# Patient Record
Sex: MALE | Race: WHITE | Employment: FULL TIME | URBAN - METROPOLITAN AREA
[De-identification: names, ages, dates, MRNs, and addresses within clinical notes are randomized per-mention and may not be internally consistent; named-entity substitution may affect disease eponyms.]

---

## 2017-01-13 ENCOUNTER — OFFICE VISIT (OUTPATIENT)
Dept: FAMILY MEDICINE | Facility: CLINIC | Age: 42
End: 2017-01-13
Payer: COMMERCIAL

## 2017-01-13 VITALS
SYSTOLIC BLOOD PRESSURE: 131 MMHG | WEIGHT: 246 LBS | HEIGHT: 76 IN | TEMPERATURE: 97.8 F | DIASTOLIC BLOOD PRESSURE: 80 MMHG | BODY MASS INDEX: 29.96 KG/M2 | HEART RATE: 74 BPM | RESPIRATION RATE: 14 BRPM

## 2017-01-13 DIAGNOSIS — J02.9 SORE THROAT: Primary | ICD-10-CM

## 2017-01-13 LAB
DEPRECATED S PYO AG THROAT QL EIA: NORMAL
MICRO REPORT STATUS: NORMAL
SPECIMEN SOURCE: NORMAL

## 2017-01-13 PROCEDURE — 87880 STREP A ASSAY W/OPTIC: CPT | Performed by: PHYSICIAN ASSISTANT

## 2017-01-13 PROCEDURE — 87081 CULTURE SCREEN ONLY: CPT | Performed by: PHYSICIAN ASSISTANT

## 2017-01-13 PROCEDURE — 99213 OFFICE O/P EST LOW 20 MIN: CPT | Performed by: PHYSICIAN ASSISTANT

## 2017-01-13 NOTE — NURSING NOTE
"Chief Complaint   Patient presents with     Pharyngitis     Initial /80 mmHg  Pulse 74  Temp(Src) 97.8  F (36.6  C) (Oral)  Resp 14  Ht 6' 4\" (1.93 m)  Wt 246 lb (111.585 kg)  BMI 29.96 kg/m2 Estimated body mass index is 29.96 kg/(m^2) as calculated from the following:    Height as of this encounter: 6' 4\" (1.93 m).    Weight as of this encounter: 246 lb (111.585 kg)..  BP completed using cuff size large-RA      "

## 2017-01-13 NOTE — PATIENT INSTRUCTIONS
Viral Pharyngitis (Sore Throat)    You (or your child, if your child is the patient) have pharyngitis (sore throat). This infection is caused by a virus. It can cause throat pain that is worse when swallowing, aching all over, headache, and fever. The infection may be spread by coughing, kissing, or touching others after touching your mouth or nose. Antibiotic medications do not work against viruses, so they are not used for treating this condition.  Home care    If your symptoms are severe, rest at home. Return to work or school when you feel well enough.     Drink plenty of fluids to avoid dehydration.    For children: Use acetaminophen for fever, fussiness or discomfort. In infants over six months of age, you may use ibuprofen instead of acetaminophen. (NOTE: If your child has chronic liver or kidney disease or ever had a stomach ulcer or GI bleeding, talk with your doctor before using these medicines.) (NOTE: Aspirin should never be used in anyone under 18 years of age who is ill with a fever. It may cause severe liver damage.)     For adults: You may use acetaminophen or ibuprofen to control pain or fever, unless another medicine was prescribed for this. (NOTE: If you have chronic liver or kidney disease or ever had a stomach ulcer or GI bleeding, talk with your doctor before using these medicines.)    Throat lozenges or numbing throat sprays can help reduce pain. Gargling with warm salt water will also help reduce throat pain. For this, dissolve 1/2 teaspoon of salt in 1 glass of warm water. To help soothe a sore throat, children can sip on juice or a popsicle. Children 5 years and older can also suck on a lollipop or hard candy.    Avoid salty or spicy foods, which can be irritating to the throat.  Follow-up care  Follow up with your healthcare provider or our staff if you are not improving over the next week.  When to seek medical advice  Call your healthcare provider right away if any of these occur:     Fever as directed by your doctor.  For children, seek care if:    Your child is of any age and has repeated fevers above 104 F (40 C).    Your child is younger than 2 years of age and has a fever of 100.4 F (38 C) that continues for more than 1 day.    Your child is 2 years old or older and has a fever of 100.4 F (38 C) that continues for more than 3 days.    New or worsening ear pain, sinus pain, or headache    Painful lumps in the back of neck    Stiff neck    Lymph nodes are getting larger    Inability to swallow liquids, excessive drooling, or inability to open mouth wide due to throat pain    Signs of dehydration (very dark urine or no urine, sunken eyes, dizziness)    Trouble breathing or noisy breathing    Muffled voice    New rash    Child appears to be getting sicker    3443-3255 [employer]. [address] [place] [address]. All rights reserved. This information is not intended as a substitute for professional medical care. Always follow your healthcare professional's instructions.

## 2017-01-13 NOTE — MR AVS SNAPSHOT
After Visit Summary   1/13/2017    Dez Martin    MRN: [id number]           Patient Information     Date Of Birth          1975        Visit Information        Provider Department      1/13/2017 10:00 AM Woo Perales PA-C [facility]        Today's Diagnoses     Sore throat    -  1       Care Instructions      Viral Pharyngitis (Sore Throat)    You (or your child, if your child is the patient) have pharyngitis (sore throat). This infection is caused by a virus. It can cause throat pain that is worse when swallowing, aching all over, headache, and fever. The infection may be spread by coughing, kissing, or touching others after touching your mouth or nose. Antibiotic medications do not work against viruses, so they are not used for treating this condition.  Home care    If your symptoms are severe, rest at home. Return to work or school when you feel well enough.     Drink plenty of fluids to avoid dehydration.    For children: Use acetaminophen for fever, fussiness or discomfort. In infants over six months of age, you may use ibuprofen instead of acetaminophen. (NOTE: If your child has chronic liver or kidney disease or ever had a stomach ulcer or GI bleeding, talk with your doctor before using these medicines.) (NOTE: Aspirin should never be used in anyone under 18 years of age who is ill with a fever. It may cause severe liver damage.)     For adults: You may use acetaminophen or ibuprofen to control pain or fever, unless another medicine was prescribed for this. (NOTE: If you have chronic liver or kidney disease or ever had a stomach ulcer or GI bleeding, talk with your doctor before using these medicines.)    Throat lozenges or numbing throat sprays can help reduce pain. Gargling with warm salt water will also help reduce throat pain. For this, dissolve 1/2 teaspoon of salt in 1 glass of warm water. To help soothe a sore throat, children can sip on juice  or a popsicle. Children 5 years and older can also suck on a lollipop or hard candy.    Avoid salty or spicy foods, which can be irritating to the throat.  Follow-up care  Follow up with your healthcare provider or our staff if you are not improving over the next week.  When to seek medical advice  Call your healthcare provider right away if any of these occur:    Fever as directed by your doctor.  For children, seek care if:    Your child is of any age and has repeated fevers above 104 F (40 C).    Your child is younger than 2 years of age and has a fever of 100.4 F (38 C) that continues for more than 1 day.    Your child is 2 years old or older and has a fever of 100.4 F (38 C) that continues for more than 3 days.    New or worsening ear pain, sinus pain, or headache    Painful lumps in the back of neck    Stiff neck    Lymph nodes are getting larger    Inability to swallow liquids, excessive drooling, or inability to open mouth wide due to throat pain    Signs of dehydration (very dark urine or no urine, sunken eyes, dizziness)    Trouble breathing or noisy breathing    Muffled voice    New rash    Child appears to be getting sicker    0437-8642 [employer]. [address] [place] [address]. All rights reserved. This information is not intended as a substitute for professional medical care. Always follow your healthcare professional's instructions.              Follow-ups after your visit        Who to contact     If you have questions or need follow up information about today's clinic visit or your schedule please contact [facility] directly at [phone].  Normal or non-critical lab and imaging results will be communicated to you by MyChart, letter or phone within 4 business days after the clinic has received the results. If you do not hear from us within 7 days, please contact the clinic through MyChart or phone. If you have a critical or abnormal lab result, we will  "notify you by phone as soon as possible.  Submit refill requests through [employer] or call your pharmacy and they will forward the refill request to us. Please allow 3 business days for your refill to be completed.          Additional Information About Your Visit        [employer] Information     [employer] gives you secure access to your electronic health record. If you see a primary care provider, you can also send messages to your care team and make appointments. If you have questions, please call your primary care clinic.  If you do not have a primary care provider, please call [phone] and they will assist you.        Care EveryWhere ID     This is your Care EveryWhere ID. This could be used by other organizations to access your [facility] medical records  [id number]        Your Vitals Were     Pulse Temperature Respirations Height BMI (Body Mass Index)       74 97.8  F (36.6  C) (Oral) 14 6' 4\" (1.93 m) 29.96 kg/m2        Blood Pressure from Last 3 Encounters:   01/13/17 131/80   11/30/16 138/86   07/14/16 127/90    Weight from Last 3 Encounters:   01/13/17 246 lb (111.585 kg)   07/14/16 246 lb (111.585 kg)   03/25/16 239 lb (108.41 kg)              We Performed the Following     Beta strep group A culture     Rapid strep screen        Primary Care Provider Office Phone # Fax #    Woo Manny Perales PA-C [phone]       [address] [address]        Thank you!     Thank you for choosing [facility]  for your care. Our goal is always to provide you with excellent care. Hearing back from our patients is one way we can continue to improve our services. Please take a few minutes to complete the written survey that you may receive in the mail after your visit with us. Thank you!             Your Updated Medication List - Protect others around you: Learn how to safely use, store and throw away your medicines at www.disposemymeds.org.    "       This list is accurate as of: 1/13/17 10:26 AM.  Always use your most recent med list.                   Brand Name Dispense Instructions for use    hydrochlorothiazide 12.5 MG Tabs tablet     90 tablet    Take 1 tablet (12.5 mg) by mouth daily

## 2017-01-13 NOTE — PROGRESS NOTES
"  SUBJECTIVE:                                                    Dze Martin is a 41 year old male who presents to clinic today for the following health issues:      Acute Illness   Acute illness concerns: throat  Onset: yesterday     Fever: no     Chills/Sweats: no     Headache (location?): no     Sinus Pressure:no    Conjunctivitis:  no    Ear Pain: no    Rhinorrhea: YES-mild    Congestion: YES-mild    Sore Throat: YES     Cough: YES-productive    Wheeze: no     Decreased Appetite: no     Nausea: no     Vomiting: no     Diarrhea:  no     Dysuria/Freq.: no     Fatigue/Achiness: no     Sick/Strep Exposure: YES- daughters with strep, mom with sinus infection     Therapies Tried and outcome: none        Problem list and histories reviewed & adjusted, as indicated.  Additional history: as documented    Problem list, Medication list, Allergies, and Medical/Social/Surgical histories reviewed in EPIC and updated as appropriate.    ROS:  Constitutional, HEENT, cardiovascular, pulmonary, gi and gu systems are negative, except as otherwise noted.    OBJECTIVE:                                                    /80 mmHg  Pulse 74  Temp(Src) 97.8  F (36.6  C) (Oral)  Resp 14  Ht 6' 4\" (1.93 m)  Wt 246 lb (111.585 kg)  BMI 29.96 kg/m2  Body mass index is 29.96 kg/(m^2).  GENERAL: fatigued, alert and no distress  EYES: Eyes grossly normal to inspection, PERRL and conjunctivae and sclerae normal  HENT: ear canals and TM's normal, nose and mouth without ulcers or lesions, mild erythema of tonsils bilateral with 1+ hypertrophy  NECK: Slight anterior cervical adenopathy, no asymmetry, masses, or scars and thyroid normal to palpation  RESP: lungs clear to auscultation - no rales, rhonchi or wheezes  CV: regular rate and rhythm, normal S1 S2, no S3 or S4, no murmur, click or rub  MS: no gross musculoskeletal defects noted, no edema    Diagnostic Test Results:  Results for orders placed or performed in visit on " 01/13/17 (from the past 24 hour(s))   Rapid strep screen   Result Value Ref Range    Specimen Description Throat     Rapid Strep A Screen       NEGATIVE: No Group A streptococcal antigen detected by immunoassay, await   culture report.      Micro Report Status FINAL 01/13/2017         ASSESSMENT/PLAN:                                                      (J02.9) Sore throat  (primary encounter diagnosis)  Comment: Negative rapid strep with known recent exposure. Emphasized supportive cares with instructions to return should symptoms worsen or not improve. No indication of acute bacterial URI with absence of purulent rhinorrhea, temperature elevation and prolonged symptom duration. If symptoms fail to improve in 1 week, patient should RTC. Sooner if worsening.   Plan: Rapid strep screen, Beta strep group A culture              Follow up: as above.     Of note, Cynthia ARMENDARIZ-S saw patient conducting above history and personal exam. I conducted my own exam, assessment, and plan as outlined above. Bolded changes were made to aid in teaching of student.     Woo Perales PA-C  [facility]

## 2017-01-15 LAB
BACTERIA SPEC CULT: NORMAL
MICRO REPORT STATUS: NORMAL
SPECIMEN SOURCE: NORMAL

## 2017-04-06 ENCOUNTER — TELEPHONE (OUTPATIENT)
Dept: FAMILY MEDICINE | Facility: CLINIC | Age: 42
End: 2017-04-06

## 2017-04-06 DIAGNOSIS — I10 BENIGN ESSENTIAL HYPERTENSION: ICD-10-CM

## 2017-04-06 NOTE — LETTER
[facility]  [address] [place], [address]  [phone]        April 13, 2017      Dez Martin  [address] [address] [address] [facility] [address]        Dear Dez,    We recently received a call from your pharmacy requesting a refill of hydrochlorothiazide.     A review of your chart indicates that a non fasting lab only appointment is required before any further refills. Please call the clinic at [phone] to schedule your appointment.     We have authorized one refill of your medication to allow time for you to schedule your appointment.     Taking care of your health is important to us and ongoing visits with your provider are vital to your care. We look forward to seeing you in the near future.       Sincerely,     [facility]

## 2017-04-06 NOTE — TELEPHONE ENCOUNTER
hydrochlorothiazide (MICROZIDE) 12.5 MG capsule      Last Written Prescription Date: 10/10/16  Last Fill Quantity: 90, # refills: 1  Last Office Visit with [facility], [facility] or [facility] prescribing provider: 1/13/2017         Potassium   Date Value Ref Range Status   03/25/2016 3.7 3.4 - 5.3 mmol/L Final     Creatinine   Date Value Ref Range Status   03/25/2016 0.90 0.66 - 1.25 mg/dL Final     BP Readings from Last 3 Encounters:   01/13/17 131/80   11/30/16 138/86   07/14/16 127/90         JEFERSON Skinner  April 6, 2017  2:26 PM

## 2017-04-07 RX ORDER — HYDROCHLOROTHIAZIDE 12.5 MG/1
CAPSULE ORAL
Qty: 90 CAPSULE | Refills: 3 | Status: SHIPPED | OUTPATIENT
Start: 2017-04-07 | End: 2017-07-10

## 2017-04-12 NOTE — TELEPHONE ENCOUNTER
ZB, multiple attempts made to reach pt by phone.  Should we send a letter? Shari Schoenberger, CMA

## 2017-07-10 ENCOUNTER — ALLIED HEALTH/NURSE VISIT (OUTPATIENT)
Dept: NURSING | Facility: CLINIC | Age: 42
End: 2017-07-10
Payer: COMMERCIAL

## 2017-07-10 VITALS
BODY MASS INDEX: 30.64 KG/M2 | SYSTOLIC BLOOD PRESSURE: 126 MMHG | HEART RATE: 80 BPM | DIASTOLIC BLOOD PRESSURE: 80 MMHG | WEIGHT: 251.7 LBS

## 2017-07-10 DIAGNOSIS — I10 BENIGN ESSENTIAL HYPERTENSION: ICD-10-CM

## 2017-07-10 PROCEDURE — 36415 COLL VENOUS BLD VENIPUNCTURE: CPT | Performed by: PHYSICIAN ASSISTANT

## 2017-07-10 PROCEDURE — 80048 BASIC METABOLIC PNL TOTAL CA: CPT | Performed by: PHYSICIAN ASSISTANT

## 2017-07-10 PROCEDURE — 99207 ZZC NO CHARGE NURSE ONLY: CPT

## 2017-07-10 RX ORDER — HYDROCHLOROTHIAZIDE 12.5 MG/1
1 CAPSULE ORAL DAILY
Qty: 90 CAPSULE | Refills: 0 | Status: SHIPPED | OUTPATIENT
Start: 2017-07-10 | End: 2017-10-03

## 2017-07-10 NOTE — MR AVS SNAPSHOT
After Visit Summary   7/10/2017    Dez Martin    MRN: [id number]           Patient Information     Date Of Birth          1975        Visit Information        Provider Department      7/10/2017 1:00 PM CR SILVER MA/LPN [facility]        Today's Diagnoses     Benign essential hypertension           Follow-ups after your visit        Who to contact     If you have questions or need follow up information about today's clinic visit or your schedule please contact [facility] directly at [phone].  Normal or non-critical lab and imaging results will be communicated to you by [employer], letter or phone within 4 business days after the clinic has received the results. If you do not hear from us within 7 days, please contact the clinic through TravelCLICKt or phone. If you have a critical or abnormal lab result, we will notify you by phone as soon as possible.  Submit refill requests through [employer] or call your pharmacy and they will forward the refill request to us. Please allow 3 business days for your refill to be completed.          Additional Information About Your Visit        MyChart Information     [employer] gives you secure access to your electronic health record. If you see a primary care provider, you can also send messages to your care team and make appointments. If you have questions, please call your primary care clinic.  If you do not have a primary care provider, please call [phone] and they will assist you.        Care EveryWhere ID     This is your Care EveryWhere ID. This could be used by other organizations to access your [facility] medical records  [id number]        Your Vitals Were     Pulse BMI (Body Mass Index)                80 30.64 kg/m2           Blood Pressure from Last 3 Encounters:   07/10/17 126/80   01/13/17 131/80   11/30/16 138/86    Weight from Last 3 Encounters:   07/10/17 251 lb 11.2 oz (114.2 kg)   01/13/17 246 lb  (111.6 kg)   07/14/16 246 lb (111.6 kg)              We Performed the Following     Basic metabolic panel          Today's Medication Changes          These changes are accurate as of: 7/10/17  1:40 PM.  If you have any questions, ask your nurse or doctor.               These medicines have changed or have updated prescriptions.        Dose/Directions    hydrochlorothiazide 12.5 MG capsule   Commonly known as:  MICROZIDE   This may have changed:  See the new instructions.   Used for:  Benign essential hypertension   Changed by:  Woo Perales PA-C        Dose:  1 capsule   Take 1 capsule (12.5 mg) by mouth daily   Quantity:  90 capsule   Refills:  0            Where to get your medicines      These medications were sent to [facility] [address] [address]     Phone:  [phone]     hydrochlorothiazide 12.5 MG capsule                Primary Care Provider Office Phone # Fax #    Woo Perales PA-C [phone]       [address] [address]        Equal Access to Services     NANCY [facility] Hospital[facility] : Hadii andre ku hadasho Soomaali, waaxda luqadaha, qaybta kaalmada adeegyalawrence, ricky bob . So [facility] [phone].    ATENCIÓN: Si habla español, tiene a salmon disposición servicios gratuitos de asistencia lingüística. Linda al [phone].    We comply with applicable federal civil rights laws and [place] laws. We do not discriminate on the basis of race, color, national origin, age, disability sex, sexual orientation or gender identity.            Thank you!     Thank you for choosing [facility]  for your care. Our goal is always to provide you with excellent care. Hearing back from our patients is one way we can continue to improve our services. Please take a few minutes to complete the written survey that you may receive in the mail  after your visit with us. Thank you!             Your Updated Medication List - Protect others around you: Learn how to safely use, store and throw away your medicines at www.disposemymeds.org.          This list is accurate as of: 7/10/17  1:40 PM.  Always use your most recent med list.                   Brand Name Dispense Instructions for use Diagnosis    hydrochlorothiazide 12.5 MG capsule    MICROZIDE    90 capsule    Take 1 capsule (12.5 mg) by mouth daily    Benign essential hypertension

## 2017-07-10 NOTE — NURSING NOTE
Dez Martin is a 42 year old male who comes in today for a Blood Pressure check because of blood pressure medication refill request.    Vitals as recorded, a large cuff was used.    Patient is taking medication as prescribed  Patient is tolerating medications well.  Patient is not monitoring Blood Pressure at home.      Current complaints: light-headedness only when hasn't eaten for 6-7 hours.  Resolves as soon as he eats.    Disposition: follow-up as indicated by MD/AP.  Blood drawn today for BMP.  Hydrochlorothiazide renewed by RN.    Patricia Portillo M.A.

## 2017-07-10 NOTE — TELEPHONE ENCOUNTER
Pt here for nurse visit BP check. Has one pill hctz remaining so requests refill   BP Readings from Last 1 Encounters:   07/10/17 126/80     Potassium   Date Value Ref Range Status   03/25/2016 3.7 3.4 - 5.3 mmol/L Final     MA will bring patient to lab for BMP ordered in April.   MA will inform pt we will refill Rx today.  #90 sent.   Jonah Tobias RN

## 2017-07-12 LAB
ANION GAP SERPL CALCULATED.3IONS-SCNC: 8 MMOL/L (ref 3–14)
BUN SERPL-MCNC: 14 MG/DL (ref 7–30)
CALCIUM SERPL-MCNC: 9.2 MG/DL (ref 8.5–10.1)
CHLORIDE SERPL-SCNC: 104 MMOL/L (ref 94–109)
CO2 SERPL-SCNC: 27 MMOL/L (ref 20–32)
CREAT SERPL-MCNC: 0.98 MG/DL (ref 0.66–1.25)
GFR SERPL CREATININE-BSD FRML MDRD: 83 ML/MIN/1.7M2
GLUCOSE SERPL-MCNC: 92 MG/DL (ref 70–99)
POTASSIUM SERPL-SCNC: 3.9 MMOL/L (ref 3.4–5.3)
SODIUM SERPL-SCNC: 139 MMOL/L (ref 133–144)

## 2017-10-03 DIAGNOSIS — I10 BENIGN ESSENTIAL HYPERTENSION: ICD-10-CM

## 2017-10-03 NOTE — TELEPHONE ENCOUNTER
Hydrochlorothiazide      Last Written Prescription Date: 07/10/17  Last Fill Quantity: 90, # refills: 0  Last Office Visit with [facility], [facility] or [facility] prescribing provider: 01/13/17 Kathrine Perales       Potassium   Date Value Ref Range Status   07/10/2017 3.9 3.4 - 5.3 mmol/L Final     Creatinine   Date Value Ref Range Status   07/10/2017 0.98 0.66 - 1.25 mg/dL Final     BP Readings from Last 3 Encounters:   07/10/17 126/80   01/13/17 131/80   11/30/16 138/86

## 2017-10-04 RX ORDER — HYDROCHLOROTHIAZIDE 12.5 MG/1
CAPSULE ORAL
Qty: 90 CAPSULE | Refills: 0 | Status: SHIPPED | OUTPATIENT
Start: 2017-10-04 | End: 2017-12-30

## 2017-10-04 NOTE — TELEPHONE ENCOUNTER
Prescription approved per [facility] Refill Protocol.  Due OV 1/13/18  Jonah Tobias RN

## 2017-11-06 ENCOUNTER — ALLIED HEALTH/NURSE VISIT (OUTPATIENT)
Dept: NURSING | Facility: CLINIC | Age: 42
End: 2017-11-06
Payer: COMMERCIAL

## 2017-11-06 DIAGNOSIS — Z23 NEED FOR PROPHYLACTIC VACCINATION AND INOCULATION AGAINST INFLUENZA: Primary | ICD-10-CM

## 2017-11-06 PROCEDURE — 90471 IMMUNIZATION ADMIN: CPT

## 2017-11-06 PROCEDURE — 90686 IIV4 VACC NO PRSV 0.5 ML IM: CPT

## 2017-11-06 PROCEDURE — 99207 ZZC NO CHARGE NURSE ONLY: CPT

## 2017-11-06 NOTE — MR AVS SNAPSHOT
After Visit Summary   11/6/2017    Dez Martin    MRN: [id number]           Patient Information     Date Of Birth          1975        Visit Information        Provider Department      11/6/2017 1:00 PM CR SILVER MA/LPN [facility]        Today's Diagnoses     Need for prophylactic vaccination and inoculation against influenza    -  1       Follow-ups after your visit        Who to contact     If you have questions or need follow up information about today's clinic visit or your schedule please contact [facility] directly at [phone].  Normal or non-critical lab and imaging results will be communicated to you by [employer], letter or phone within 4 business days after the clinic has received the results. If you do not hear from us within 7 days, please contact the clinic through [employer] Bridget or phone. If you have a critical or abnormal lab result, we will notify you by phone as soon as possible.  Submit refill requests through [employer] or call your pharmacy and they will forward the refill request to us. Please allow 3 business days for your refill to be completed.          Additional Information About Your Visit        MyChart Information     [employer] gives you secure access to your electronic health record. If you see a primary care provider, you can also send messages to your care team and make appointments. If you have questions, please call your primary care clinic.  If you do not have a primary care provider, please call [phone] and they will assist you.        Care EveryWhere ID     This is your Care EveryWhere ID. This could be used by other organizations to access your [facility] medical records  [id number]         Blood Pressure from Last 3 Encounters:   07/10/17 126/80   01/13/17 131/80   11/30/16 138/86    Weight from Last 3 Encounters:   07/10/17 251 lb 11.2 oz (114.2 kg)   01/13/17 246 lb (111.6 kg)   07/14/16 246 lb (111.6 kg)               We Performed the Following     FLU VAC, SPLIT VIRUS IM > 3 YO (QUADRIVALENT) [21868]     Vaccine Administration, Initial [69944]        Primary Care Provider Office Phone # Fax #    Woo Manny Perales PA-C [phone] [address]  [facility] [address]        Equal Access to Services     NANCY JAY : Hadii aad ku hadasho Soomaali, waaxda luqadaha, qaybta kaalmada adeegyada, waxay mateoin hayaan adeeg kharanikki lajesusita . So [facility] [phone].    ATENCIÓN: Si habla español, tiene a salmon disposición servicios gratuitos de asistencia lingüística. Allename al [phone].    We comply with applicable federal civil rights laws and [place] laws. We do not discriminate on the basis of race, color, national origin, age, disability, sex, sexual orientation, or gender identity.            Thank you!     Thank you for choosing [facility]  for your care. Our goal is always to provide you with excellent care. Hearing back from our patients is one way we can continue to improve our services. Please take a few minutes to complete the written survey that you may receive in the mail after your visit with us. Thank you!             Your Updated Medication List - Protect others around you: Learn how to safely use, store and throw away your medicines at www.disposemymeds.org.          This list is accurate as of: 11/6/17  1:05 PM.  Always use your most recent med list.                   Brand Name Dispense Instructions for use Diagnosis    hydrochlorothiazide 12.5 MG capsule    MICROZIDE    90 capsule    TAKE ONE CAPSULE BY MOUTH EVERY DAY    Benign essential hypertension

## 2017-11-06 NOTE — PROGRESS NOTES

## 2017-12-30 DIAGNOSIS — I10 BENIGN ESSENTIAL HYPERTENSION: ICD-10-CM

## 2018-01-03 RX ORDER — HYDROCHLOROTHIAZIDE 12.5 MG/1
CAPSULE ORAL
Qty: 30 CAPSULE | Refills: 0 | Status: SHIPPED | OUTPATIENT
Start: 2018-01-03 | End: 2018-01-11

## 2018-01-03 NOTE — TELEPHONE ENCOUNTER
Requested Prescriptions   Pending Prescriptions Disp Refills     hydrochlorothiazide (MICROZIDE) 12.5 MG capsule [Pharmacy Med Name: HYDROCHLOROTHIAZIDE 12.5MG CAPS]  Last Written Prescription Date:  10/4/2017  Last Fill Quantity: 90 capsule,  # refills: 0   Last Office Visit with [facility], [facility] or [facility] prescribing provider:  1/13/2017   Future Office Visit:      90 capsule 0     Sig: TAKE ONE CAPSULE BY MOUTH EVERY DAY    Diuretics (Including Combos) Protocol Passed    12/30/2017  9:55 AM       Passed - Blood pressure under 140/90    BP Readings from Last 3 Encounters:   07/10/17 126/80   01/13/17 131/80   11/30/16 138/86                Passed - Recent or future visit with authorizing provider's specialty    Patient had office visit in the last year or has a visit in the next 30 days with authorizing provider.  See chart review.              Passed - Patient is age 18 or older       Passed - Normal serum creatinine on file in past 12 months    Recent Labs   Lab Test  07/10/17   1327   CR  0.98             Passed - Normal serum potassium on file in past 12 months    Recent Labs   Lab Test  07/10/17   1327   POTASSIUM  3.9                   Passed - Normal serum sodium on file in past 12 months    Recent Labs   Lab Test  07/10/17   1327   NA  139

## 2018-01-03 NOTE — TELEPHONE ENCOUNTER
Patient is scheduled to see Jordi 1/11/18 for a med check.    Enedina RHODES    01/03/18 2:23 PM

## 2018-01-11 ENCOUNTER — OFFICE VISIT (OUTPATIENT)
Dept: FAMILY MEDICINE | Facility: CLINIC | Age: 43
End: 2018-01-11
Payer: COMMERCIAL

## 2018-01-11 VITALS
OXYGEN SATURATION: 97 % | TEMPERATURE: 98 F | RESPIRATION RATE: 15 BRPM | WEIGHT: 241.4 LBS | HEART RATE: 77 BPM | SYSTOLIC BLOOD PRESSURE: 130 MMHG | BODY MASS INDEX: 30.02 KG/M2 | HEIGHT: 75 IN | DIASTOLIC BLOOD PRESSURE: 70 MMHG

## 2018-01-11 DIAGNOSIS — I10 BENIGN ESSENTIAL HYPERTENSION: ICD-10-CM

## 2018-01-11 PROCEDURE — 99213 OFFICE O/P EST LOW 20 MIN: CPT | Performed by: PHYSICIAN ASSISTANT

## 2018-01-11 PROCEDURE — 80048 BASIC METABOLIC PNL TOTAL CA: CPT | Performed by: PHYSICIAN ASSISTANT

## 2018-01-11 PROCEDURE — 36415 COLL VENOUS BLD VENIPUNCTURE: CPT | Performed by: PHYSICIAN ASSISTANT

## 2018-01-11 RX ORDER — HYDROCHLOROTHIAZIDE 12.5 MG/1
1 CAPSULE ORAL DAILY
Qty: 90 CAPSULE | Refills: 3 | Status: SHIPPED | OUTPATIENT
Start: 2018-01-11 | End: 2018-11-13

## 2018-01-11 RX ORDER — LORATADINE 10 MG/1
10 TABLET, ORALLY DISINTEGRATING ORAL
COMMUNITY
Start: 2012-04-05 | End: 2018-01-11

## 2018-01-11 NOTE — MR AVS SNAPSHOT
"              After Visit Summary   1/11/2018    Dez Martin    MRN: [id number]           Patient Information     Date Of Birth          1975        Visit Information        Provider Department      1/11/2018 2:45 PM Woo Perales PA-C [facility]        Today's Diagnoses     Benign essential hypertension           Follow-ups after your visit        Who to contact     If you have questions or need follow up information about today's clinic visit or your schedule please contact [facility] directly at [phone].  Normal or non-critical lab and imaging results will be communicated to you by [employer], letter or phone within 4 business days after the clinic has received the results. If you do not hear from us within 7 days, please contact the clinic through [facility] [employer] or phone. If you have a critical or abnormal lab result, we will notify you by phone as soon as possible.  Submit refill requests through [employer] or call your pharmacy and they will forward the refill request to us. Please allow 3 business days for your refill to be completed.          Additional Information About Your Visit        MyChart Information     [employer] gives you secure access to your electronic health record. If you see a primary care provider, you can also send messages to your care team and make appointments. If you have questions, please call your primary care clinic.  If you do not have a primary care provider, please call [phone] and they will assist you.        Care EveryWhere ID     This is your Care EveryWhere ID. This could be used by other organizations to access your [place] medical records  [id number]        Your Vitals Were     Pulse Temperature Respirations Height Pulse Oximetry BMI (Body Mass Index)    77 98  F (36.7  C) (Oral) 15 6' 2.5\" (1.892 m) 97% 30.58 kg/m2       Blood Pressure from Last 3 Encounters:   01/11/18 130/70   07/10/17 126/80   01/13/17 131/80    " Weight from Last 3 Encounters:   01/11/18 241 lb 6.4 oz (109.5 kg)   07/10/17 251 lb 11.2 oz (114.2 kg)   01/13/17 246 lb (111.6 kg)              Today, you had the following     No orders found for display         Today's Medication Changes          These changes are accurate as of: 1/11/18  2:59 PM.  If you have any questions, ask your nurse or doctor.               These medicines have changed or have updated prescriptions.        Dose/Directions    CLARITIN PO   This may have changed:  Another medication with the same name was removed. Continue taking this medication, and follow the directions you see here.   Changed by:  Woo Perales PA-C        Dose:  10 mg   Take 10 mg by mouth daily   Refills:  0       hydrochlorothiazide 12.5 MG capsule   Commonly known as:  MICROZIDE   This may have changed:  See the new instructions.   Used for:  Benign essential hypertension   Changed by:  Woo Perales PA-C        Dose:  1 capsule   Take 1 capsule (12.5 mg) by mouth daily   Quantity:  90 capsule   Refills:  3            Where to get your medicines      These medications were sent to [facility] [address]  [id number] [place], [place] [id number]     Phone:  [phone]     hydrochlorothiazide 12.5 MG capsule                Primary Care Provider Office Phone # Fax #    Woo Perales PA-C [phone] [address] [facility] [address]        Equal Access to Services     NANCY JAY AH: Dayday baileyo Soliveali, waaxda luqadaha, qaybta kaalmada adeegyada, ricky wylie. So [facility] [phone].    ATENCIÓN: Si habla español, tiene a salmon disposición servicios gratuitos de asistencia lingüística. Linda al [phone].    We comply with applicable federal civil rights laws and [place] laws. We do not discriminate on the basis of race, color, national origin, age, disability, sex, sexual orientation, or  gender identity.            Thank you!     Thank you for choosing [facility]  for your care. Our goal is always to provide you with excellent care. Hearing back from our patients is one way we can continue to improve our services. Please take a few minutes to complete the written survey that you may receive in the mail after your visit with us. Thank you!             Your Updated Medication List - Protect others around you: Learn how to safely use, store and throw away your medicines at www.disposemymeds.org.          This list is accurate as of: 1/11/18  2:59 PM.  Always use your most recent med list.                   Brand Name Dispense Instructions for use Diagnosis    CLARITIN PO      Take 10 mg by mouth daily        hydrochlorothiazide 12.5 MG capsule    MICROZIDE    90 capsule    Take 1 capsule (12.5 mg) by mouth daily    Benign essential hypertension

## 2018-01-11 NOTE — PROGRESS NOTES
SUBJECTIVE:                                                    Dez Martin is a 42 year old male who presents to clinic today for the following health issues:      History of Present Illness     Hypertension:     Outpatient blood pressures:  Are not being checked    Dietary sodium intake::  Not monitoring salt intake    Diet:  Regular (no restrictions)  Frequency of exercise:  6-7 days/week  Duration of exercise:  45-60 minutes  Taking medications regularly:  Yes  Medication side effects:  None  Additional concerns today:  No    Stable. No side effects. Bps have remained normal.       Problem list and histories reviewed & adjusted, as indicated.  Additional history: as documented      Patient Active Problem List   Diagnosis     CARDIOVASCULAR SCREENING; LDL GOAL LESS THAN 160     Benign essential hypertension     Bilateral tinnitus     History reviewed. No pertinent surgical history.    Social History   Substance Use Topics     Smoking status: Never Smoker     Smokeless tobacco: Never Used     Alcohol use 0.0 oz/week     0 Standard drinks or equivalent per week      Comment: socially     Family History   Problem Relation Age of Onset     Myocardial Infarction Father      age 50 and 65     Hyperlipidemia Father          Current Outpatient Prescriptions   Medication Sig Dispense Refill     Loratadine (CLARITIN PO) Take 10 mg by mouth daily       hydrochlorothiazide (MICROZIDE) 12.5 MG capsule Take 1 capsule (12.5 mg) by mouth daily 90 capsule 3     [DISCONTINUED] hydrochlorothiazide (MICROZIDE) 12.5 MG capsule TAKE ONE CAPSULE BY MOUTH EVERY DAY 30 capsule 0     No Known Allergies  BP Readings from Last 3 Encounters:   01/11/18 130/70   07/10/17 126/80   01/13/17 131/80    Wt Readings from Last 3 Encounters:   01/11/18 241 lb 6.4 oz (109.5 kg)   07/10/17 251 lb 11.2 oz (114.2 kg)   01/13/17 246 lb (111.6 kg)                        ROS:  Constitutional, HEENT, cardiovascular, pulmonary, gi and gu systems  "are negative, except as otherwise noted.      OBJECTIVE:   /70 (BP Location: Right arm, Patient Position: Chair, Cuff Size: Adult Regular)  Pulse 77  Temp 98  F (36.7  C) (Oral)  Resp 15  Ht 6' 2.5\" (1.892 m)  Wt 241 lb 6.4 oz (109.5 kg)  SpO2 97%  BMI 30.58 kg/m2  Body mass index is 30.58 kg/(m^2).  GENERAL: healthy, alert and no distress  RESP: lungs clear to auscultation - no rales, rhonchi or wheezes  CV: regular rates and rhythm, normal S1 S2, no S3 or S4 and no murmur, click or rub  PSYCH: mentation appears normal, affect normal/bright    Diagnostic Test Results:  none     ASSESSMENT/PLAN:     (I10) Benign essential hypertension  Comment: stable. Follow up in 1 year with fasting physical  Plan: hydrochlorothiazide (MICROZIDE) 12.5 MG         capsule, Basic metabolic panel        -Medication use and side effects discussed with the patient. Patient is in complete understanding and agreement with plan.         Follow up: as above     Woo Perales PA-C  [facility]"

## 2018-01-12 LAB
ANION GAP SERPL CALCULATED.3IONS-SCNC: 7 MMOL/L (ref 3–14)
BUN SERPL-MCNC: 17 MG/DL (ref 7–30)
CALCIUM SERPL-MCNC: 9 MG/DL (ref 8.5–10.1)
CHLORIDE SERPL-SCNC: 103 MMOL/L (ref 94–109)
CO2 SERPL-SCNC: 29 MMOL/L (ref 20–32)
CREAT SERPL-MCNC: 1.03 MG/DL (ref 0.66–1.25)
GFR SERPL CREATININE-BSD FRML MDRD: 79 ML/MIN/1.7M2
GLUCOSE SERPL-MCNC: 84 MG/DL (ref 70–99)
POTASSIUM SERPL-SCNC: 4 MMOL/L (ref 3.4–5.3)
SODIUM SERPL-SCNC: 139 MMOL/L (ref 133–144)

## 2018-11-13 ENCOUNTER — OFFICE VISIT (OUTPATIENT)
Dept: FAMILY MEDICINE | Facility: CLINIC | Age: 43
End: 2018-11-13
Payer: COMMERCIAL

## 2018-11-13 VITALS
BODY MASS INDEX: 32.05 KG/M2 | WEIGHT: 253 LBS | HEART RATE: 80 BPM | DIASTOLIC BLOOD PRESSURE: 82 MMHG | RESPIRATION RATE: 16 BRPM | TEMPERATURE: 98.1 F | SYSTOLIC BLOOD PRESSURE: 128 MMHG

## 2018-11-13 DIAGNOSIS — R68.82 DECREASED LIBIDO: ICD-10-CM

## 2018-11-13 DIAGNOSIS — I10 BENIGN ESSENTIAL HYPERTENSION: ICD-10-CM

## 2018-11-13 DIAGNOSIS — Z00.00 ROUTINE GENERAL MEDICAL EXAMINATION AT A HEALTH CARE FACILITY: Primary | ICD-10-CM

## 2018-11-13 DIAGNOSIS — R00.2 PALPITATIONS: ICD-10-CM

## 2018-11-13 DIAGNOSIS — R53.83 OTHER FATIGUE: ICD-10-CM

## 2018-11-13 DIAGNOSIS — Z23 NEED FOR PROPHYLACTIC VACCINATION AND INOCULATION AGAINST INFLUENZA: ICD-10-CM

## 2018-11-13 LAB
ANION GAP SERPL CALCULATED.3IONS-SCNC: 7 MMOL/L (ref 3–14)
BUN SERPL-MCNC: 14 MG/DL (ref 7–30)
CALCIUM SERPL-MCNC: 9.3 MG/DL (ref 8.5–10.1)
CHLORIDE SERPL-SCNC: 104 MMOL/L (ref 94–109)
CHOLEST SERPL-MCNC: 213 MG/DL
CO2 SERPL-SCNC: 27 MMOL/L (ref 20–32)
CREAT SERPL-MCNC: 0.99 MG/DL (ref 0.66–1.25)
GFR SERPL CREATININE-BSD FRML MDRD: 83 ML/MIN/1.7M2
GLUCOSE SERPL-MCNC: 96 MG/DL (ref 70–99)
HDLC SERPL-MCNC: 37 MG/DL
LDLC SERPL CALC-MCNC: 128 MG/DL
NONHDLC SERPL-MCNC: 176 MG/DL
POTASSIUM SERPL-SCNC: 4 MMOL/L (ref 3.4–5.3)
SODIUM SERPL-SCNC: 138 MMOL/L (ref 133–144)
TRIGL SERPL-MCNC: 239 MG/DL

## 2018-11-13 PROCEDURE — 80061 LIPID PANEL: CPT | Performed by: PHYSICIAN ASSISTANT

## 2018-11-13 PROCEDURE — 99396 PREV VISIT EST AGE 40-64: CPT | Mod: 25 | Performed by: PHYSICIAN ASSISTANT

## 2018-11-13 PROCEDURE — 80048 BASIC METABOLIC PNL TOTAL CA: CPT | Performed by: PHYSICIAN ASSISTANT

## 2018-11-13 PROCEDURE — 84403 ASSAY OF TOTAL TESTOSTERONE: CPT | Performed by: PHYSICIAN ASSISTANT

## 2018-11-13 PROCEDURE — 90688 IIV4 VACCINE SPLT 0.5 ML IM: CPT | Performed by: PHYSICIAN ASSISTANT

## 2018-11-13 PROCEDURE — 84270 ASSAY OF SEX HORMONE GLOBUL: CPT | Performed by: PHYSICIAN ASSISTANT

## 2018-11-13 PROCEDURE — 90471 IMMUNIZATION ADMIN: CPT | Performed by: PHYSICIAN ASSISTANT

## 2018-11-13 PROCEDURE — 36415 COLL VENOUS BLD VENIPUNCTURE: CPT | Performed by: PHYSICIAN ASSISTANT

## 2018-11-13 ASSESSMENT — ENCOUNTER SYMPTOMS
HEMATOCHEZIA: 0
PALPITATIONS: 1
COUGH: 0
WEAKNESS: 0
PARESTHESIAS: 0
DIARRHEA: 0
SHORTNESS OF BREATH: 0
ABDOMINAL PAIN: 0
JOINT SWELLING: 0
NERVOUS/ANXIOUS: 0
FATIGUE: 1
FEVER: 0
HEADACHES: 0
FREQUENCY: 0
HEARTBURN: 0
MYALGIAS: 0
DIZZINESS: 0
ARTHRALGIAS: 0
CHILLS: 0
HEMATURIA: 0
CONSTIPATION: 0
EYE PAIN: 0
SORE THROAT: 0
NAUSEA: 0
DYSURIA: 0

## 2018-11-13 NOTE — PROGRESS NOTES
SUBJECTIVE:   CC: Dez Martin is an 43 year old male who presents for preventative health visit.     Physical   Annual:     Getting at least 3 servings of Calcium per day:  Yes    Bi-annual eye exam:  Yes    Dental care twice a year:  Yes    Sleep apnea or symptoms of sleep apnea:  None    Diet:  Regular (no restrictions)    Frequency of exercise:  2-3 days/week    Duration of exercise:  15-30 minutes    Taking medications regularly:  Yes    Additional concerns today:  No      Hypertension Follow-up      Outpatient blood pressures are not being checked.    Low Salt Diet: not monitoring salt  -dehydration  -patient stopped 2-3 months ago-concerns for side effects    Today's PHQ-2 Score:   PHQ-2 ( 1999 [employer]) 11/13/2018   Q1: Little interest or pleasure in doing things 0   Q2: Feeling down, depressed or hopeless 0   PHQ-2 Score 0   Q1: Little interest or pleasure in doing things Not at all   Q2: Feeling down, depressed or hopeless Not at all   PHQ-2 Score 0       Abuse: Current or Past(Physical, Sexual or Emotional)- No  Do you feel safe in your environment - Yes    Social History   Substance Use Topics     Smoking status: Never Smoker     Smokeless tobacco: Never Used     Alcohol use 0.0 oz/week     0 Standard drinks or equivalent per week      Comment: socially     Alcohol Use 11/13/2018   If you drink alcohol do you typically have greater than 3 drinks per day OR greater than 7 drinks per week? No       Last PSA: No results found for: PSA    Reviewed orders with patient. Reviewed health maintenance and updated orders accordingly - Yes  BP Readings from Last 3 Encounters:   11/13/18 128/82   01/11/18 130/70   07/10/17 126/80    Wt Readings from Last 3 Encounters:   11/13/18 253 lb (114.8 kg)   01/11/18 241 lb 6.4 oz (109.5 kg)   07/10/17 251 lb 11.2 oz (114.2 kg)                  Patient Active Problem List   Diagnosis     CARDIOVASCULAR SCREENING; LDL GOAL LESS THAN 160     Benign essential hypertension      Bilateral tinnitus     History reviewed. No pertinent surgical history.    Social History   Substance Use Topics     Smoking status: Never Smoker     Smokeless tobacco: Never Used     Alcohol use 0.0 oz/week     0 Standard drinks or equivalent per week      Comment: socially     Family History   Problem Relation Age of Onset     Myocardial Infarction Father      age 50 and 65     Hyperlipidemia Father      Breast Cancer Mother      Kidney Cancer Brother          Current Outpatient Prescriptions   Medication Sig Dispense Refill     Loratadine (CLARITIN PO) Take 10 mg by mouth daily       No Known Allergies    Reviewed and updated as needed this visit by clinical staff  Tobacco  Allergies  Meds  Problems  Med Hx  Surg Hx  Fam Hx  Soc Hx          Reviewed and updated as needed this visit by Provider  Allergies  Meds  Problems        History reviewed. No pertinent past medical history.   History reviewed. No pertinent surgical history.    Review of Systems   Constitutional: Positive for fatigue. Negative for chills and fever.   HENT: Negative for congestion, ear pain, hearing loss and sore throat.    Eyes: Negative for pain and visual disturbance.   Respiratory: Negative for cough and shortness of breath.    Cardiovascular: Positive for palpitations. Negative for chest pain and peripheral edema.   Gastrointestinal: Negative for abdominal pain, constipation, diarrhea, heartburn, hematochezia and nausea.   Genitourinary: Positive for impotence. Negative for discharge, dysuria, frequency, genital sores, hematuria and urgency.        Decreased libido     Musculoskeletal: Negative for arthralgias, joint swelling and myalgias.   Skin: Negative for rash.   Neurological: Negative for dizziness, weakness, headaches and paresthesias.   Psychiatric/Behavioral: Negative for mood changes. The patient is not nervous/anxious.      States at times at night, feels heart is pounding. No racing heart rate or irregular heart  rate. At times can see heart beat in abdomen with lying down. Father with history of MI.     OBJECTIVE:   /82 (BP Location: Right arm, Patient Position: Chair, Cuff Size: Adult Large)  Pulse 80  Temp 98.1  F (36.7  C) (Oral)  Resp 16  Wt 253 lb (114.8 kg)  BMI 32.05 kg/m2    Physical Exam  GENERAL: healthy, alert and no distress  EYES: Eyes grossly normal to inspection, PERRL and conjunctivae and sclerae normal  HENT: ear canals and TM's normal, nose and mouth without ulcers or lesions  NECK: no adenopathy, no asymmetry, masses, or scars and thyroid normal to palpation  RESP: lungs clear to auscultation - no rales, rhonchi or wheezes  CV: regular rate and rhythm, normal S1 S2, no S3 or S4, no murmur, click or rub, no peripheral edema and peripheral pulses strong  ABDOMEN: soft, nontender, no hepatosplenomegaly, no masses and bowel sounds normal. No bruits.    (male): normal male genitalia without lesions or urethral discharge, no hernia  MS: no gross musculoskeletal defects noted, no edema  SKIN: no suspicious lesions or rashes  NEURO: Normal strength and tone, mentation intact and speech normal  PSYCH: mentation appears normal, affect normal/bright  LYMPH: no cervical adenopathy    Diagnostic Test Results:  none     ASSESSMENT/PLAN:   (Z00.00) Routine general medical examination at a health care facility  (primary encounter diagnosis)  Comment: normal exam   Plan: Lipid panel reflex to direct LDL Fasting, Basic        metabolic panel            (R68.82) Decreased libido  Comment:   Plan: Testosterone Free and Total            (R53.83) Other fatigue  Comment:   Plan: Testosterone Free and Total            (Z23) Need for prophylactic vaccination and inoculation against influenza  Comment:   Plan: FLU VACCINE, IM (QUADRIVALENT         W/PRESERVATIVES/MULTI-DOSE) [84517]- >3 YRS,         Vaccine Administration, Initial [07041]            (R00.2) Palpitations  Comment: normal exam. Given history,  "recommending avoiding alcohol at night. If symptoms fail to improve, would consider AAA evaluation given history of past htn and male gender. Age is reassuring against this.   Plan:     (I10) Benign essential hypertension  Comment: normal today without medication. Recommending continued monitoring and dash diet. If increases, ACE inhibitor to be used.   Plan:     COUNSELING:   Reviewed preventive health counseling, as reflected in patient instructions       Regular exercise       Healthy diet/nutrition    BP Readings from Last 1 Encounters:   11/13/18 128/82     Estimated body mass index is 32.05 kg/(m^2) as calculated from the following:    Height as of 1/11/18: 6' 2.5\" (1.892 m).    Weight as of this encounter: 253 lb (114.8 kg).      Weight management plan: Discussed healthy diet and exercise guidelines and patient will follow up in 12 months in clinic to re-evaluate.     reports that he has never smoked. He has never used smokeless tobacco.      Counseling Resources:  ATP IV Guidelines  Pooled Cohorts Equation Calculator  FRAX Risk Assessment  ICSI Preventive Guidelines  Dietary Guidelines for Americans, 2010  [employer]'s MyPlate  ASA Prophylaxis  Lung CA Screening    Woo Perales PA-C  [facility]  Answers for HPI/ROS submitted by the patient on 11/13/2018   PHQ-2 Score: 0      Injectable Influenza Immunization Documentation    1.  Is the person to be vaccinated sick today?   No    2. Does the person to be vaccinated have an allergy to a component   of the vaccine?   No  Egg Allergy Algorithm Link    3. Has the person to be vaccinated ever had a serious reaction   to influenza vaccine in the past?   No    4. Has the person to be vaccinated ever had Guillain-Barré syndrome?   No    Form completed by pt         "

## 2018-11-13 NOTE — PATIENT INSTRUCTIONS
Preventive Health Recommendations  Male Ages 40 to 49    Yearly exam:             See your health care provider every year in order to  o   Review health changes.   o   Discuss preventive care.    o   Review your medicines if your doctor has prescribed any.    You should be tested each year for STDs (sexually transmitted diseases) if you re at risk.     Have a cholesterol test every 5 years.     Have a colonoscopy (test for colon cancer) if someone in your family has had colon cancer or polyps before age 50.     After age 45, have a diabetes test (fasting glucose). If you are at risk for diabetes, you should have this test every 3 years.      Talk with your health care provider about whether or not a prostate cancer screening test (PSA) is right for you.    Shots: Get a flu shot each year. Get a tetanus shot every 10 years.     Nutrition:    Eat at least 5 servings of fruits and vegetables daily.     Eat whole-grain bread, whole-wheat pasta and brown rice instead of white grains and rice.     Get adequate Calcium and Vitamin D.     Lifestyle    Exercise for at least 150 minutes a week (30 minutes a day, 5 days a week). This will help you control your weight and prevent disease.     Limit alcohol to one drink per day.     No smoking.     Wear sunscreen to prevent skin cancer.     See your dentist every six months for an exam and cleaning.      Eating Heart-Healthy Food: Using the DASH Plan    Eating for your heart doesn t have to be hard or boring. You just need to know how to make healthier choices. The DASH eating plan has been developed to help you do just that. DASH stands for Dietary Approaches to Stop Hypertension. It is a plan that has been proven to be healthier for your heart and to lower your risk for high blood pressure. It can also help lower your risk for cancer, heart disease, osteoporosis, and diabetes.  Choosing from each food group  Choose foods from each of the food groups below each day. Try to  get the recommended number of servings for each food group. The serving numbers are based on a diet of 2,000 calories a day. Talk to your doctor if you re unsure about your calorie needs. Along with getting the correct servings, the DASH plan also recommends a sodium intake less than 2,300 mg per day.        Grains  Servings: 6 to 8 a day  A serving is:    1 slice bread    1 ounce dry cereal    Half a cup cooked rice, pasta or cereal  Best choices: Whole grains and any grains high in fiber. Vegetables  Servings: 4 to 5 a day  A serving is:    1 cup raw leafy vegetable    Half a cup cut-up raw or cooked vegetable    Half a cup vegetable juice  Best choices: Fresh or frozen vegetables prepared without added salt or fat.   Fruits  Servings: 4 to 5 a day  A serving is:    1 medium fruit    One-quarter cup dried fruit    Half a cup fresh, frozen, or canned fruit    Half a cup of 100% fruit juices  Best choices: A variety of fresh fruits of different colors. Whole fruits are a better choice than fruit juices. Low-fat or fat-free dairy  Servings: 2 to 3 a day  A serving is:    1 cup milk    1 cup yogurt    One and a half ounces cheese  Best choices: Skim or 1% milk, low-fat or fat-free yogurt or buttermilk, and low-fat cheeses.         Lean meats, poultry, fish  Servings: 6 or fewer a day  A serving is:    1 ounce cooked meats, poultry, or fish    1 egg  Best choices: Lean poultry and fish. Trim away visible fat. Broil, grill, roast, or boil instead of frying. Remove skin from poultry before eating. Limit how much red meat you eat.  Nuts, seeds, beans  Servings: 4 to 5 a week  A serving is:    One-third cup nuts (one and a half ounces)    2 tablespoons nut butter or seeds    Half a cup cooked dry beans or legumes  Best choices: Dry roasted nuts with no salt added, lentils, kidney beans, garbanzo beans, and whole mccray beans.   Fats and oils  Servings: 2 to 3 a day  A serving is:    1 teaspoon vegetable oil    1 teaspoon  soft margarine    1 tablespoon mayonnaise    2 tablespoons salad dressing  Best choices: Nut and vegetable oils (nontropical vegetable oils), such as olive and canola oil. Sweets  Servings: 5 a week or fewer  A serving is:    1 tablespoon sugar, maple syrup, or honey    1 tablespoon jam or jelly    1 half-ounce jelly beans (about 15)    1 cup lemonade  Best choices: Dried fruit can be a satisfying sweet. Choose low-fat sweets. And watch your serving sizes!      For more on the DASH eating plan, visit:  www.nhlbi.nih.gov/health/health-topics/topics/dash   Date Last Reviewed: 6/1/2016 2000-2018 [employer]. [address] [place] [address], [place] [address]. All rights reserved. This information is not intended as a substitute for professional medical care. Always follow your healthcare professional's instructions.

## 2018-11-15 LAB
SHBG SERPL-SCNC: 24 NMOL/L (ref 11–80)
TESTOST FREE SERPL-MCNC: 6.94 NG/DL (ref 4.7–24.4)
TESTOST SERPL-MCNC: 297 NG/DL (ref 240–950)

## 2020-03-05 ENCOUNTER — ANCILLARY PROCEDURE (OUTPATIENT)
Dept: GENERAL RADIOLOGY | Facility: CLINIC | Age: 45
End: 2020-03-05
Attending: PHYSICIAN ASSISTANT
Payer: COMMERCIAL

## 2020-03-05 ENCOUNTER — OFFICE VISIT (OUTPATIENT)
Dept: FAMILY MEDICINE | Facility: CLINIC | Age: 45
End: 2020-03-05
Payer: COMMERCIAL

## 2020-03-05 VITALS
BODY MASS INDEX: 33.32 KG/M2 | WEIGHT: 263 LBS | OXYGEN SATURATION: 97 % | SYSTOLIC BLOOD PRESSURE: 132 MMHG | TEMPERATURE: 98.3 F | DIASTOLIC BLOOD PRESSURE: 78 MMHG | HEART RATE: 105 BPM

## 2020-03-05 DIAGNOSIS — S83.522A RUPTURE OF POSTERIOR CRUCIATE LIGAMENT OF LEFT KNEE, INITIAL ENCOUNTER: ICD-10-CM

## 2020-03-05 DIAGNOSIS — S89.92XA KNEE INJURY, LEFT, INITIAL ENCOUNTER: ICD-10-CM

## 2020-03-05 DIAGNOSIS — S89.92XA KNEE INJURY, LEFT, INITIAL ENCOUNTER: Primary | ICD-10-CM

## 2020-03-05 PROCEDURE — 99214 OFFICE O/P EST MOD 30 MIN: CPT | Performed by: PHYSICIAN ASSISTANT

## 2020-03-05 PROCEDURE — 73562 X-RAY EXAM OF KNEE 3: CPT | Mod: LT

## 2020-03-05 NOTE — PATIENT INSTRUCTIONS
Continue to apply ice and take ibuprofen as needed for pain.    If anything is abnormal on the MRI, I will refer to orthopedics.
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
## 2020-03-10 ENCOUNTER — HEALTH MAINTENANCE LETTER (OUTPATIENT)
Age: 45
End: 2020-03-10

## 2020-03-11 ENCOUNTER — TELEPHONE (OUTPATIENT)
Dept: FAMILY MEDICINE | Facility: CLINIC | Age: 45
End: 2020-03-11

## 2020-03-11 ENCOUNTER — HOSPITAL ENCOUNTER (OUTPATIENT)
Dept: MRI IMAGING | Facility: CLINIC | Age: 45
Discharge: HOME | End: 2020-03-11
Attending: PHYSICIAN ASSISTANT | Admitting: PHYSICIAN ASSISTANT
Payer: COMMERCIAL

## 2020-03-11 DIAGNOSIS — S89.92XA KNEE INJURY, LEFT, INITIAL ENCOUNTER: ICD-10-CM

## 2020-03-11 PROCEDURE — 73721 MRI JNT OF LWR EXTRE W/O DYE: CPT | Mod: LT

## 2020-03-11 NOTE — TELEPHONE ENCOUNTER
left message for call back  Iraida Tim RN    Notes recorded by Ezio Pang PA-C on 3/11/2020 at 3:22 PM CDT   Please call patient. His MRI shows that he has PCL and MCL ligament tears. He also possible has a meniscus tear. He should follow-up with orthopedics to discuss treatment options which may include surgery. I have placed a referral.     Ezio Pang PA-C on 3/11/2020 at 3:21 PM

## 2020-03-12 NOTE — TELEPHONE ENCOUNTER
Called pt, viewed [employer] message, has f/u ortho appointment, no questions  Iraida Tim RN, BSN  Message handled by Nurse Triage.

## 2020-03-19 ENCOUNTER — OFFICE VISIT (OUTPATIENT)
Dept: ORTHOPEDICS | Facility: CLINIC | Age: 45
End: 2020-03-19
Payer: COMMERCIAL

## 2020-03-19 VITALS
HEIGHT: 76 IN | SYSTOLIC BLOOD PRESSURE: 142 MMHG | DIASTOLIC BLOOD PRESSURE: 85 MMHG | WEIGHT: 263 LBS | BODY MASS INDEX: 32.03 KG/M2

## 2020-03-19 DIAGNOSIS — M25.562 CHRONIC PAIN OF LEFT KNEE: Primary | ICD-10-CM

## 2020-03-19 DIAGNOSIS — G89.29 CHRONIC PAIN OF LEFT KNEE: Primary | ICD-10-CM

## 2020-03-19 PROCEDURE — 99203 OFFICE O/P NEW LOW 30 MIN: CPT | Performed by: ORTHOPAEDIC SURGERY

## 2020-03-19 ASSESSMENT — MIFFLIN-ST. JEOR: SCORE: 2179.46
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
## 2020-05-07 ENCOUNTER — VIRTUAL VISIT (OUTPATIENT)
Dept: ORTHOPEDICS | Facility: CLINIC | Age: 45
End: 2020-05-07
Payer: COMMERCIAL

## 2020-05-07 VITALS — HEIGHT: 76 IN | BODY MASS INDEX: 31.05 KG/M2 | WEIGHT: 255 LBS

## 2020-05-07 DIAGNOSIS — M25.562 CHRONIC PAIN OF LEFT KNEE: Primary | ICD-10-CM

## 2020-05-07 DIAGNOSIS — G89.29 CHRONIC PAIN OF LEFT KNEE: Primary | ICD-10-CM

## 2020-05-07 PROCEDURE — 99213 OFFICE O/P EST LOW 20 MIN: CPT | Mod: GT | Performed by: ORTHOPAEDIC SURGERY

## 2020-05-07 ASSESSMENT — PAIN SCALES - GENERAL: PAINLEVEL: NO PAIN (0)

## 2020-05-07 ASSESSMENT — MIFFLIN-ST. JEOR: SCORE: 2143.17

## 2020-05-07 NOTE — PROGRESS NOTES
"Dez Martin is a 45 year old male who is being evaluated via a billable video visit.      The patient has been notified of following:     \"This video visit will be conducted via a call between you and your physician/provider. We have found that certain health care needs can be provided without the need for an in-person physical exam.  This service lets us provide the care you need with a video conversation.  If a prescription is necessary we can send it directly to your pharmacy.  If lab work is needed we can place an order for that and you can then stop by our lab to have the test done at a later time.    Video visits are billed at different rates depending on your insurance coverage.  Please reach out to your insurance provider with any questions.    If during the course of the call the physician/provider feels a video visit is not appropriate, you will not be charged for this service.\"    Patient has given verbal consent for Video visit? Yes    How would you like to obtain your AVS? Jaelyn    Patient would like the video invitation sent by: Send to e-mail at: [email or web address]    Will anyone else be joining your video visit? No      Patient states his Left knee has improved 80%. He uses the hinged knee brace with exercise, bike, run, yard work. He does not use it with around the house.     Patient has avoided activities that include stop cutting and pivoting sports as recommended at the last visit.      Patient denies any new left knee symptoms.     Patient does have have any left knee pain that ever requires IBU or ice. The only time he will feel an instantaneous pain is with a deep knee bend. Pain stops when he extends the knee to a more \"normal\" range of motion per patient.  At this time he does not really feel too limited by his knee and most of his desired activities.  He is able to do his work responsibilities as well as some of his leisure activities such as fishing.    An exam was not " really completed today based on the video visit.  Overall it appeared that his motion profile was preserved.  His neurologic function appeared normal.  Obviously a stability exam could not be performed.    Clinical assessment.  PCL tear, MCL tear.  Doing well.    Plan: Long discussion with the patient.  At this time I think he is doing remarkably well.  I really impressed the progress that he is making.  He can continue to transition back to his desired activities.  No specific restrictions.  He can follow-up with me on an as-needed basis.  If he fails to see lasting improvement we certainly could consider surgical reconstruction in the future however at this time it seems that we may be able to avoid this.              Video-Visit Details    Type of service:  Video Visit    Video Start Time: 0930  Video End Time: 0940    Originating Location (pt. Location): home    Distant Location (provider location):  [facility] ORTHOPEDIC SURGERY     Platform used for Video Visit: Dario Nuñez MD

## 2020-12-27 ENCOUNTER — HEALTH MAINTENANCE LETTER (OUTPATIENT)
Age: 45
End: 2020-12-27

## 2021-04-22 ENCOUNTER — IMMUNIZATION (OUTPATIENT)
Dept: NURSING | Facility: CLINIC | Age: 46
End: 2021-04-22
Payer: COMMERCIAL

## 2021-04-22 PROCEDURE — 0011A PR COVID VAC MODERNA 100 MCG/0.5 ML IM: CPT

## 2021-04-22 PROCEDURE — 91301 PR COVID VAC MODERNA 100 MCG/0.5 ML IM: CPT

## 2021-04-24 ENCOUNTER — HEALTH MAINTENANCE LETTER (OUTPATIENT)
Age: 46
End: 2021-04-24

## 2021-05-20 ENCOUNTER — IMMUNIZATION (OUTPATIENT)
Dept: NURSING | Facility: CLINIC | Age: 46
End: 2021-05-20
Attending: INTERNAL MEDICINE
Payer: COMMERCIAL

## 2021-05-20 PROCEDURE — 0012A PR COVID VAC MODERNA 100 MCG/0.5 ML IM: CPT

## 2021-05-20 PROCEDURE — 91301 PR COVID VAC MODERNA 100 MCG/0.5 ML IM: CPT

## 2021-10-04 ENCOUNTER — HEALTH MAINTENANCE LETTER (OUTPATIENT)
Age: 46
End: 2021-10-04

## 2021-11-09 NOTE — MR AVS SNAPSHOT
After Visit Summary   11/13/2018    Dez Martin    MRN: [id number]           Patient Information     Date Of Birth          1975        Visit Information        Provider Department      11/13/2018 7:30 AM Woo Perales PA-C [facility]        Today's Diagnoses     Routine general medical examination at a health care facility    -  1    Decreased libido        Other fatigue          Care Instructions      Preventive Health Recommendations  Male Ages 40 to 49    Yearly exam:             See your health care provider every year in order to  o   Review health changes.   o   Discuss preventive care.    o   Review your medicines if your doctor has prescribed any.    You should be tested each year for STDs (sexually transmitted diseases) if you re at risk.     Have a cholesterol test every 5 years.     Have a colonoscopy (test for colon cancer) if someone in your family has had colon cancer or polyps before age 50.     After age 45, have a diabetes test (fasting glucose). If you are at risk for diabetes, you should have this test every 3 years.      Talk with your health care provider about whether or not a prostate cancer screening test (PSA) is right for you.    Shots: Get a flu shot each year. Get a tetanus shot every 10 years.     Nutrition:    Eat at least 5 servings of fruits and vegetables daily.     Eat whole-grain bread, whole-wheat pasta and brown rice instead of white grains and rice.     Get adequate Calcium and Vitamin D.     Lifestyle    Exercise for at least 150 minutes a week (30 minutes a day, 5 days a week). This will help you control your weight and prevent disease.     Limit alcohol to one drink per day.     No smoking.     Wear sunscreen to prevent skin cancer.     See your dentist every six months for an exam and cleaning.      Eating Heart-Healthy Food: Using the DASH Plan    Eating for your heart doesn t have to be hard or boring. You just  need to know how to make healthier choices. The DASH eating plan has been developed to help you do just that. DASH stands for Dietary Approaches to Stop Hypertension. It is a plan that has been proven to be healthier for your heart and to lower your risk for high blood pressure. It can also help lower your risk for cancer, heart disease, osteoporosis, and diabetes.  Choosing from each food group  Choose foods from each of the food groups below each day. Try to get the recommended number of servings for each food group. The serving numbers are based on a diet of 2,000 calories a day. Talk to your doctor if you re unsure about your calorie needs. Along with getting the correct servings, the DASH plan also recommends a sodium intake less than 2,300 mg per day.        Grains  Servings: 6 to 8 a day  A serving is:    1 slice bread    1 ounce dry cereal    Half a cup cooked rice, pasta or cereal  Best choices: Whole grains and any grains high in fiber. Vegetables  Servings: 4 to 5 a day  A serving is:    1 cup raw leafy vegetable    Half a cup cut-up raw or cooked vegetable    Half a cup vegetable juice  Best choices: Fresh or frozen vegetables prepared without added salt or fat.   Fruits  Servings: 4 to 5 a day  A serving is:    1 medium fruit    One-quarter cup dried fruit    Half a cup fresh, frozen, or canned fruit    Half a cup of 100% fruit juices  Best choices: A variety of fresh fruits of different colors. Whole fruits are a better choice than fruit juices. Low-fat or fat-free dairy  Servings: 2 to 3 a day  A serving is:    1 cup milk    1 cup yogurt    One and a half ounces cheese  Best choices: Skim or 1% milk, low-fat or fat-free yogurt or buttermilk, and low-fat cheeses.         Lean meats, poultry, fish  Servings: 6 or fewer a day  A serving is:    1 ounce cooked meats, poultry, or fish    1 egg  Best choices: Lean poultry and fish. Trim away visible fat. Broil, grill, roast, or boil instead of frying. Remove  skin from poultry before eating. Limit how much red meat you eat.  Nuts, seeds, beans  Servings: 4 to 5 a week  A serving is:    One-third cup nuts (one and a half ounces)    2 tablespoons nut butter or seeds    Half a cup cooked dry beans or legumes  Best choices: Dry roasted nuts with no salt added, lentils, kidney beans, garbanzo beans, and whole mccray beans.   Fats and oils  Servings: 2 to 3 a day  A serving is:    1 teaspoon vegetable oil    1 teaspoon soft margarine    1 tablespoon mayonnaise    2 tablespoons salad dressing  Best choices: Nut and vegetable oils (nontropical vegetable oils), such as olive and canola oil. Sweets  Servings: 5 a week or fewer  A serving is:    1 tablespoon sugar, maple syrup, or honey    1 tablespoon jam or jelly    1 half-ounce jelly beans (about 15)    1 cup lemonade  Best choices: Dried fruit can be a satisfying sweet. Choose low-fat sweets. And watch your serving sizes!      For more on the DASH eating plan, visit:  www.nhlbi.nih.gov/health/health-topics/topics/dash   Date Last Reviewed: 6/1/2016 2000-2018 [email or web address]. [address] [place] [address]. All rights reserved. This information is not intended as a substitute for professional medical care. Always follow your healthcare professional's instructions.                Follow-ups after your visit        Who to contact     If you have questions or need follow up information about today's clinic visit or your schedule please contact [facility] directly at [phone].  Normal or non-critical lab and imaging results will be communicated to you by MyChart, letter or phone within 4 business days after the clinic has received the results. If you do not hear from us within 7 days, please contact the clinic through MyChart or phone. If you have a critical or abnormal lab result, we will notify you by phone as soon as possible.  Submit refill requests through [employer] or call your  [Menarche Age ____] : age at menarche was [unfilled] pharmacy and they will forward the refill request to us. Please allow 3 business days for your refill to be completed.          Additional Information About Your Visit        MyChart Information     [email or web address] gives you secure access to your electronic health record. If you see a primary care provider, you can also send messages to your care team and make appointments. If you have questions, please call your primary care clinic.  If you do not have a primary care provider, please call [phone] and they will assist you.        Care EveryWhere ID     This is your Care EveryWhere ID. This could be used by other organizations to access your [facility] medical records  [id number]        Your Vitals Were     Pulse Temperature Respirations BMI (Body Mass Index)          80 98.1  F (36.7  C) (Oral) 16 32.05 kg/m2         Blood Pressure from Last 3 Encounters:   11/13/18 128/82   01/11/18 130/70   07/10/17 126/80    Weight from Last 3 Encounters:   11/13/18 253 lb (114.8 kg)   01/11/18 241 lb 6.4 oz (109.5 kg)   07/10/17 251 lb 11.2 oz (114.2 kg)              We Performed the Following     Basic metabolic panel     Lipid panel reflex to direct LDL Fasting     Testosterone Free and Total        Primary Care Provider Office Phone # Fax #    Woo Manny Perales PA-C [phone] [address] [facility] [address]        Equal Access to Services     HARVINDER JAY : Hadii andre Last, waaxda luqadaha, qaybta kaalmada tish, ricky bob . So [facility] [phone].    ATENCIÓN: Si habla español, tiene a salmon disposición servicios gratuitos de asistencia lingüística. Linda al [phone].    We comply with applicable federal civil rights laws and [place] laws. We do not discriminate on the basis of race, color, national origin, age, disability, sex, sexual orientation, or gender identity.            Thank you!     Thank you for choosing [facility]   [Menopause Age____] : age at menopause was [unfilled] for your care. Our goal is always to provide you with excellent care. Hearing back from our patients is one way we can continue to improve our services. Please take a few minutes to complete the written survey that you may receive in the mail after your visit with us. Thank you!             Your Updated Medication List - Protect others around you: Learn how to safely use, store and throw away your medicines at www.disposemymeds.org.          This list is accurate as of 11/13/18  8:11 AM.  Always use your most recent med list.                   Brand Name Dispense Instructions for use Diagnosis    CLARITIN PO      Take 10 mg by mouth daily           [Total Preg ___] : G[unfilled] [Live Births ___] : P[unfilled]  [Age At Live Birth ___] : Age at live birth: [unfilled] [History of Hormone Replacement Treatment] : has no history of hormone replacement treatment [FreeTextEntry5] : ovarian cystectomy in the past  [FreeTextEntry6] : denies [FreeTextEntry7] : denies [FreeTextEntry8] : yes x 6 months

## 2022-01-04 ENCOUNTER — IMMUNIZATION (OUTPATIENT)
Dept: NURSING | Facility: CLINIC | Age: 47
End: 2022-01-04
Payer: COMMERCIAL

## 2022-01-04 PROCEDURE — 91306 COVID-19,PF,MODERNA (18+ YRS BOOSTER .25ML): CPT

## 2022-01-04 PROCEDURE — 0064A COVID-19,PF,MODERNA (18+ YRS BOOSTER .25ML): CPT

## 2022-01-24 ENCOUNTER — OFFICE VISIT (OUTPATIENT)
Dept: FAMILY MEDICINE | Facility: CLINIC | Age: 47
End: 2022-01-24
Payer: COMMERCIAL

## 2022-01-24 ENCOUNTER — NURSE TRIAGE (OUTPATIENT)
Dept: NURSING | Facility: CLINIC | Age: 47
End: 2022-01-24

## 2022-01-24 VITALS
WEIGHT: 269 LBS | DIASTOLIC BLOOD PRESSURE: 82 MMHG | BODY MASS INDEX: 32.76 KG/M2 | TEMPERATURE: 98.6 F | OXYGEN SATURATION: 96 % | HEART RATE: 96 BPM | SYSTOLIC BLOOD PRESSURE: 142 MMHG | HEIGHT: 76 IN

## 2022-01-24 DIAGNOSIS — Z23 ENCOUNTER FOR IMMUNIZATION: ICD-10-CM

## 2022-01-24 DIAGNOSIS — R00.2 HEART PALPITATIONS: Primary | ICD-10-CM

## 2022-01-24 PROCEDURE — 90471 IMMUNIZATION ADMIN: CPT | Performed by: FAMILY MEDICINE

## 2022-01-24 PROCEDURE — 99214 OFFICE O/P EST MOD 30 MIN: CPT | Mod: 25 | Performed by: FAMILY MEDICINE

## 2022-01-24 PROCEDURE — 90686 IIV4 VACC NO PRSV 0.5 ML IM: CPT | Performed by: FAMILY MEDICINE

## 2022-01-24 PROCEDURE — 93000 ELECTROCARDIOGRAM COMPLETE: CPT | Performed by: FAMILY MEDICINE

## 2022-01-24 ASSESSMENT — MIFFLIN-ST. JEOR: SCORE: 2201.68

## 2022-01-24 NOTE — PROGRESS NOTES
"      Gary Garces is a 46 year old who presents for the following health issues feels that his heart     HPI   Palpitations started while he was on vacation and has improved since he got home. He has a no tobacco history and a normal prior stress nuclear study   Onset/Duration: 1-22-22  Description:   Location: entire chest  Character: burning and fluttering   Radiation: none  Duration: constant   Intensity: moderate  Progression of Symptoms: improving  Accompanying Signs & Symptoms:  Shortness of breath: no  Sweating: no  Nausea/vomiting: no  Lightheadedness: no  Palpitations: YES  Fever/Chills: no  Cough: no           Heartburn: no  History:   Family history of heart disease: YES  Tobacco use: no  Previous similar symptoms: no   Precipitating factors:   Worse with exertion: no  Worse with deep breaths: no           Related to eating: no           Better with burping: no  Alleviating factors: mild  Therapies tried and outcome: none        Review of Systems   Constitutional, HEENT, cardiovascular, pulmonary, GI, , musculoskeletal, neuro, skin, endocrine and psych systems are negative, except as otherwise noted.      Objective  BP (!) 142/82   Pulse 96   Temp 98.6  F (37  C) (Oral)   Ht 1.93 m (6' 4\")   Wt 122 kg (269 lb)   SpO2 96%   BMI 32.74 kg/m      Physical Exam   GENERAL: healthy, alert and no distress  EYES: Eyes grossly normal to inspection, PERRL and conjunctivae and sclerae normal  HENT: ear canals and TM's normal, nose and mouth without ulcers or lesions  NECK: no adenopathy, no asymmetry, masses, or scars and thyroid normal to palpation  RESP: lungs clear to auscultation - no rales, rhonchi or wheezes  CV: regular rate and rhythm, normal S1 S2, no S3 or S4, no murmur, click or rub, no peripheral edema and peripheral pulses strong  ABDOMEN: soft, nontender, no hepatosplenomegaly, no masses and bowel sounds normal  MS: no gross musculoskeletal defects noted, no edema  SKIN: no suspicious " lesions or rashes  NEURO: Normal strength and tone, mentation intact and speech normal  PSYCH: mentation appears normal, affect normal/bright  EKG is normal 12 lead, he doesn't have irregular heartbeat on clinical exam or while getting the tracing     I suggested a leadless monitor and a resting echocardiogram and he agreed.    (R00.2) Heart palpitations  (primary encounter diagnosis)  Comment:   Plan: EKG 12-lead complete w/read - Clinics, Leadless        EKG Monitor 3 to 7 Days, Echocardiogram         Complete with Bubble Study            (Z23) Encounter for immunization  Comment:   Plan: influenza vaccine given and he's been covid immunized

## 2022-01-24 NOTE — TELEPHONE ENCOUNTER
RN Triage:    Spoke with 46 yr old Dez who c/o:    Heart palpitations/skipping a beat for the past 2 days.    Flutters sometimes occur every 3rd beat and sometimes every 30 th beat.    Pretty consistent over the weekend and sometimes occurs more than 4 times in a minute.    Denies chest pain.    Denies dizziness or weakness.    PLAN:  Advised OV today per protocol.  Pt transferred to PSR to schedule OV.  Pt scheduled today.  Advised to call back if symptoms are worsening.    Gracia Oshea RN  [facility]      Reason for Disposition    Skipped or extra beat(s) and occurs 4 or more times per minute    Additional Information    Negative: Passed out (i.e., fainted, collapsed and was not responding)    Negative: Shock suspected (e.g., cold/pale/clammy skin, too weak to stand, low BP, rapid pulse)    Negative: Difficult to awaken or acting confused (e.g., disoriented, slurred speech)    Negative: Visible sweat on face or sweat dripping down face    Negative: Unable to walk, or can only walk with assistance (e.g., requires support)    Negative: Received SHOCK from implantable cardiac defibrillator and has persisting symptoms (i.e., palpitations, lightheadedness)    Negative: Dizziness, lightheadedness, or weakness and heart beating very rapidly (e.g., > 140 / minute)    Negative: Dizziness, lightheadedness, or weakness and heart beating very slowly (e.g., < 50 / minute)    Negative: Sounds like a life-threatening emergency to the triager    Negative: Chest pain    Negative: Difficulty breathing    Negative: Dizziness, lightheadedness, or weakness    Negative: Heart beating very rapidly (e.g., > 140 / minute) and present now (Exception: during exercise)    Negative: Heart beating very slowly (e.g., < 50 / minute) (Exception: athlete)    Negative: New or worsened shortness of breath with activity (dyspnea on exertion)    Negative: Patient sounds very sick or weak to the triager    Negative: Wearing a 'Holter  monitor' or 'cardiac event monitor'    Negative: Received SHOCK from implantable cardiac defibrillator (and now feels well)    Negative: Heart beating very rapidly (e.g., > 140 / minute) and not present now (Exception: during exercise)    Negative: Skipped or extra beat(s) and increases with exercise or exertion    Protocols used: HEART RATE AND HEARTBEAT [id number]

## 2022-01-25 ENCOUNTER — HOSPITAL ENCOUNTER (OUTPATIENT)
Dept: CARDIOLOGY | Facility: CLINIC | Age: 47
Discharge: HOME | End: 2022-01-25
Attending: FAMILY MEDICINE | Admitting: FAMILY MEDICINE
Payer: COMMERCIAL

## 2022-01-25 DIAGNOSIS — R00.2 HEART PALPITATIONS: ICD-10-CM

## 2022-01-25 PROCEDURE — 93244 EXT ECG>48HR<7D REV&INTERPJ: CPT | Performed by: INTERNAL MEDICINE

## 2022-01-25 PROCEDURE — 93242 EXT ECG>48HR<7D RECORDING: CPT

## 2022-02-15 ENCOUNTER — OFFICE VISIT (OUTPATIENT)
Dept: FAMILY MEDICINE | Facility: CLINIC | Age: 47
End: 2022-02-15
Payer: COMMERCIAL

## 2022-02-15 VITALS
TEMPERATURE: 98.5 F | BODY MASS INDEX: 32.76 KG/M2 | OXYGEN SATURATION: 97 % | SYSTOLIC BLOOD PRESSURE: 124 MMHG | HEART RATE: 107 BPM | WEIGHT: 269 LBS | DIASTOLIC BLOOD PRESSURE: 76 MMHG | HEIGHT: 76 IN

## 2022-02-15 DIAGNOSIS — I49.1 PAC (PREMATURE ATRIAL CONTRACTION): Primary | ICD-10-CM

## 2022-02-15 DIAGNOSIS — Z12.11 SCREENING FOR COLON CANCER: ICD-10-CM

## 2022-02-15 DIAGNOSIS — R06.83 SNORING: ICD-10-CM

## 2022-02-15 PROCEDURE — 99213 OFFICE O/P EST LOW 20 MIN: CPT | Performed by: PHYSICIAN ASSISTANT

## 2022-02-15 ASSESSMENT — MIFFLIN-ST. JEOR: SCORE: 2201.68

## 2022-02-15 NOTE — PATIENT INSTRUCTIONS
Patient Education     Premature Ventricular Contractions  Premature ventricular contractions (PVCs) are a type of arrhythmia (irregular heartbeat). They are common and can affect people of all ages. PVCs are almost never dangerous. But if other heart problems are present, PVCs can cause serious health issues. This sheet tells you more about PVCs and how they are treated.  Understanding Your Heart s Electrical System     During a normal heartbeat, electrical signals start at the SA node and are sent through the walls of the heart s chambers.   To understand how PVCs occur, it helps to first understand how your heart works. Your heart is a muscle that pumps blood throughout the body. It is made up of 4 chambers: 2 atria and 2 ventricles. Electrical signals are sent to these chambers, making them contract (squeeze) in a certain order. This rhythm, which pumps blood through and out of your heart, is your heartbeat. The process begins in the sinoatrial (SA) or sinus node. This is the heart's natural pacemaker:    The SA node. This group of cells in the right atrium sends a signal to both atria, telling them to contract. When the atria contract, blood is pumped into the ventricles.     The AV node. This is another group of cells in the right atrium. It receives the signal from the SA node after it passes through the atria. The AV node transmits the electrical signal from the atria to the ventricles.   What Happens During a PVC?  During a PVC, an abnormal signal disrupts the normal heartbeat. This signal comes from the ventricle instead of the SA node. The signal causes the ventricles to contract too soon, and the heart skips the next normal beat. This results in an irregular heartbeat.  What Are the Symptoms of PVCs?  Sometimes PVCs cause no symptoms at all. Other times, a patient may feel palpitations (irregular heartbeats). These can feel like  skipped  beats, or  flopping  in the chest. If PVCs are frequent, other  symptoms can occur. These include tiredness, feeling faint, or shortness of breath. They also include fullness or pressure in the neck, and chest pain. These symptoms occur because less oxygen is delivered to the body. This is because PVCs make the heart pump blood less effectively.  What Causes PVCs?  In some cases, no cause of PVCs is found. When a cause is found, it is either chemical or structural:    Chemical. Changes in the body s chemistry can prompt PVCs. For instance, raised levels of certain hormones, such as adrenaline or thyroid, can cause PVCs. Consuming substances such as alcohol and caffeine can also cause them.    Structural. This involves existing problems in the heart and/or cardiovascular system. Coronary artery disease (CAD) is 1 type of problem that can be related to PVCs. Others are heart failure and heart valve problems.   How Are PVCs Diagnosed?  The doctor will take your medical history and ask you to describe your symptoms. You ll also have a physical exam. And certain tests may be done. These include:    Electrocardiogram (ECG or EKG). This test records the electrical activity of your heart. During an ECG, small pads (electrodes) are placed on your chest, arms, and legs. Wires connect the pads to a machine, which records your heart s electrical signals.    Heart monitor. There are 2 types of external heart monitors:  ? Holter monitor. This monitor can be worn for 1 to 7 days. It provides a constant recording of heart activity. After the test is done, your health care provider analyzes the recording.  ? Event monitors. These monitors can be used for 3 to 4 weeks. One kind is a memory loop recorder. This monitor records constantly, but stores the recording only when you press a button. The other kind is a credit card-sized recorder. This monitor is turned on only during an episode. With both types, you send the recordings of symptoms to your health care provider over the phone.  How Are PVCs  Treated?  Treatment depends on whether structural heart problems are present. It is also determined by the severity of symptoms:    If you have no other heart problems and your symptoms are not bothersome, treatment may not be needed. If it is needed, treatment can involve:  ? Lifestyle changes. Your doctor may suggest that you exercise and limit caffeine and alcohol. If you smoke, you ll be advised to quit.  ? Medications. Two types of medication can help with PVCs. Beta-blockers and calcium channel blockers both can lower the heart rate and reduce blood pressure.    If you have structural heart problems, you ll likely be referred to a doctor who specializes in heart rhythm problems. This may be a cardiologist or an electrophysiologist. An electrophysiologist is a cardiologist who specializes in the electrical system of the heart. You will need a referral because for you, PVCs can be a bigger threat to your health. Depending on the nature of your heart disease, you may need treatment for an underlying heart problem. In severe cases, an ICD (implantable cardioverter defibrillator) may be implanted. This is done to treat the underlying heart disease. It can help normalize the heart rhythm.  Date Last Reviewed: 3/7/2014    9953-2764 [employer]. [address] [place] [address], [place] [address]. All rights reserved. This information is not intended as a substitute for professional medical care. Always follow your healthcare professional's instructions.

## 2022-02-15 NOTE — PROGRESS NOTES
Assessment & Plan     PAC (premature atrial contraction)  Present on holter and events were flagged by patient that correlates with these. Likely cause of symptoms. Given improvements, will monitor. May cancel echo. Discussed life style changes for management including alcohol intake. Admits to worsening snoring, may be contributing to his symptoms. Recommending sleep consult.     Snoring    - SLEEP EVALUATION & MANAGEMENT REFERRAL - ADULT -; Future    Screening for colon cancer    - Adult Gastro Ref - Procedure Only; Future        Return for Physical Exam, Lab Work.    Woo Perales PA-C  [facility] DAPHNEY Garces is a 46 year old who presents for the following health issues     History of Present Illness       Vascular Disease:  He presents for follow up of vascular disease.  He never takes nitroglycerin. He is not taking daily aspirin.    He eats 0-1 servings of fruits and vegetables daily.He consumes 0 sweetened beverage(s) daily.He exercises with enough effort to increase his heart rate 30 to 60 minutes per day.  He exercises with enough effort to increase his heart rate 4 days per week.   He is taking medications regularly.       Pt here following up on test results. In summary, ~3 weeks ago returned from [place] and suffered from palpitations. He was seen for this and EKG was normal. holter monitor obtained showing symptomatic ectopic beats/pacs. Patient states symptoms have improved. Only 1 occurrence for a few seconds yesterday and no symptoms since. No chest pains or shortness of breath. Of note, patient does states if he consumes alcohol, the next day symptoms are more prevalent. Patient has echo scheduled in 3 weeks and wonders if this should be maintained.       Review of Systems   Constitutional, HEENT, cardiovascular, pulmonary, GI, , musculoskeletal, neuro, skin, endocrine and psych systems are negative, except as otherwise noted.      Objective    BP  "124/76   Pulse 107   Temp 98.5  F (36.9  C) (Oral)   Ht 1.93 m (6' 4\")   Wt 122 kg (269 lb)   SpO2 97%   BMI 32.74 kg/m    Body mass index is 32.74 kg/m .  Physical Exam   GENERAL: healthy, alert and no distress  RESP: lungs clear to auscultation - no rales, rhonchi or wheezes  CV: regular rates and rhythm, normal S1 S2, no S3 or S4 and no murmur, click or rub  PSYCH: mentation appears normal, affect normal/bright        "

## 2022-05-15 ENCOUNTER — HEALTH MAINTENANCE LETTER (OUTPATIENT)
Age: 47
End: 2022-05-15

## 2022-09-11 ENCOUNTER — HEALTH MAINTENANCE LETTER (OUTPATIENT)
Age: 47
End: 2022-09-11

## 2022-11-11 NOTE — TELEPHONE ENCOUNTER
CRS POD#1 s/p Robotic Sigmoid colectomy    Doing well. Passed some gas but no BM. Some nausea but no emesis. Pain controlled.     /72   Pulse 60   Temp 98 °F (36.7 °C)   Resp 18   Ht 5' 6.25\" (1.683 m)   Wt 67.3 kg (148 lb 5.9 oz)   LMP 02/27/2018   SpO2 97%   BMI 23.77 kg/m²     NAD, AAOx3  Abd soft, approp TTP  Incisions c/d/I with dermabond    WBC elevated    Plan  -Monitor labs  -Await return of bowel function  -D/c blackwell  -Possible home this weekend Lab only is all that is needed.     Thanks,    Jordi Perales, PAC

## 2023-06-03 ENCOUNTER — HEALTH MAINTENANCE LETTER (OUTPATIENT)
Age: 48
End: 2023-06-03

## 2024-03-05 ENCOUNTER — TELEPHONE (OUTPATIENT)
Dept: FAMILY MEDICINE | Facility: CLINIC | Age: 49
End: 2024-03-05

## 2024-03-05 ENCOUNTER — TELEPHONE (OUTPATIENT)
Dept: FAMILY MEDICINE | Facility: CLINIC | Age: 49
End: 2024-03-05
Payer: COMMERCIAL

## 2024-03-05 NOTE — TELEPHONE ENCOUNTER
Pt called in inquiring about scheduling with Dr. Llanes for low testosterone. Pt states he had recent tests done that show level of 141. Pt is currently scheduled with his PCP for 04/19/2024 but was hoping that he could be seen sooner with Dr. Llanes.     I will send DAKOTA for labs/OV and new pt paperwork today 03/05/2024. Pt informed that he will need to complete paperwork and send back prior to being called by Emily.    Phone number and insurance verified.    Dimple Lee on 3/5/2024 at 8:40 AM

## 2024-04-19 ENCOUNTER — OFFICE VISIT (OUTPATIENT)
Dept: FAMILY MEDICINE | Facility: CLINIC | Age: 49
End: 2024-04-19
Payer: COMMERCIAL

## 2024-04-19 VITALS
BODY MASS INDEX: 33.42 KG/M2 | HEIGHT: 76 IN | DIASTOLIC BLOOD PRESSURE: 82 MMHG | SYSTOLIC BLOOD PRESSURE: 136 MMHG | OXYGEN SATURATION: 98 % | TEMPERATURE: 97.9 F | RESPIRATION RATE: 18 BRPM | HEART RATE: 86 BPM | WEIGHT: 274.4 LBS

## 2024-04-19 DIAGNOSIS — R63.5 WEIGHT GAIN: ICD-10-CM

## 2024-04-19 DIAGNOSIS — R06.83 SNORING: ICD-10-CM

## 2024-04-19 DIAGNOSIS — R68.82 DECREASED LIBIDO: Primary | ICD-10-CM

## 2024-04-19 LAB
ERYTHROCYTE [DISTWIDTH] IN BLOOD BY AUTOMATED COUNT: 11.3 % (ref 10–15)
HBA1C MFR BLD: 5.2 % (ref 0–5.6)
HCT VFR BLD AUTO: 44 % (ref 40–53)
HGB BLD-MCNC: 15.8 G/DL (ref 13.3–17.7)
MCH RBC QN AUTO: 31 PG (ref 26.5–33)
MCHC RBC AUTO-ENTMCNC: 35.9 G/DL (ref 31.5–36.5)
MCV RBC AUTO: 86 FL (ref 78–100)
PLATELET # BLD AUTO: 265 10E3/UL (ref 150–450)
RBC # BLD AUTO: 5.09 10E6/UL (ref 4.4–5.9)
WBC # BLD AUTO: 5.4 10E3/UL (ref 4–11)

## 2024-04-19 PROCEDURE — 84403 ASSAY OF TOTAL TESTOSTERONE: CPT | Performed by: FAMILY MEDICINE

## 2024-04-19 PROCEDURE — 85027 COMPLETE CBC AUTOMATED: CPT | Performed by: FAMILY MEDICINE

## 2024-04-19 PROCEDURE — 84270 ASSAY OF SEX HORMONE GLOBUL: CPT | Performed by: FAMILY MEDICINE

## 2024-04-19 PROCEDURE — 83036 HEMOGLOBIN GLYCOSYLATED A1C: CPT | Performed by: FAMILY MEDICINE

## 2024-04-19 PROCEDURE — 99214 OFFICE O/P EST MOD 30 MIN: CPT | Performed by: FAMILY MEDICINE

## 2024-04-19 PROCEDURE — 80053 COMPREHEN METABOLIC PANEL: CPT | Performed by: FAMILY MEDICINE

## 2024-04-19 PROCEDURE — 84443 ASSAY THYROID STIM HORMONE: CPT | Performed by: FAMILY MEDICINE

## 2024-04-19 PROCEDURE — 36415 COLL VENOUS BLD VENIPUNCTURE: CPT | Performed by: FAMILY MEDICINE

## 2024-04-19 RX ORDER — MULTIVITAMIN,THERAPEUTIC
1 TABLET ORAL DAILY
COMMUNITY

## 2024-04-19 ASSESSMENT — PAIN SCALES - GENERAL: PAINLEVEL: NO PAIN (0)

## 2024-04-19 NOTE — PROGRESS NOTES
"  Assessment & Plan     Decreased libido  Will get labs today with recommendations pending results.  Discussed testosterone side effects, both injections and topical.  If his levels return low, he will let us know which form he would like to try.  - Testosterone Free and Total; Future  - Comprehensive metabolic panel (BMP + Alb, Alk Phos, ALT, AST, Total. Bili, TP); Future  - TSH with free T4 reflex; Future  - Hemoglobin A1c; Future  - CBC with platelets; Future  - Testosterone Free and Total  - Comprehensive metabolic panel (BMP + Alb, Alk Phos, ALT, AST, Total. Bili, TP)  - TSH with free T4 reflex  - Hemoglobin A1c  - CBC with platelets    Weight gain  As above  - Testosterone Free and Total; Future  - Comprehensive metabolic panel (BMP + Alb, Alk Phos, ALT, AST, Total. Bili, TP); Future  - TSH with free T4 reflex; Future  - Hemoglobin A1c; Future  - CBC with platelets; Future  - Testosterone Free and Total  - Comprehensive metabolic panel (BMP + Alb, Alk Phos, ALT, AST, Total. Bili, TP)  - TSH with free T4 reflex  - Hemoglobin A1c  - CBC with platelets    Snoring  - Adult Sleep Eval & Management  Referral; Future      18 minutes spent by me on the date of the encounter doing chart review, history and exam, documentation and further activities per the note      BMI  Estimated body mass index is 33.4 kg/m  as calculated from the following:    Height as of this encounter: 1.93 m (6' 4\").    Weight as of this encounter: 124.5 kg (274 lb 6.4 oz).         See Patient Instructions    Gary Garces is a 49 year old, presenting for the following health issues:  testerone (Discuss treatment and/or referral for low Testerone levels. )        4/19/2024     2:36 PM   Additional Questions   Roomed by Alaina Gonzalze     History of Present Illness       Reason for visit:  Low Testosterone    He eats 0-1 servings of fruits and vegetables daily.He consumes 1 sweetened beverage(s) daily.He exercises with enough effort " "to increase his heart rate 20 to 29 minutes per day.  He exercises with enough effort to increase his heart rate 3 or less days per week.   He is taking medications regularly.     Pt would like to discuss treatment and or a referral for low Testosterone levels.    Decreased libido, weight gain, fatigue.    Went to [facility], checked testosterone levels, level reported at 141.  They do not take his insurance, so he cannot get treatment with them.  Will be making an appointment with endocrinology.    Additionally he has been snoring, tried a mouthguard which did not work. Would like referral.    Patient Active Problem List   Diagnosis    Benign essential hypertension    Bilateral tinnitus    PAC (premature atrial contraction)    Hyperlipidemia    Obesity, unspecified     History reviewed. No pertinent past medical history.    History reviewed. No pertinent surgical history.    Family History   Problem Relation Age of Onset    Myocardial Infarction Father         age 50 and 65    Hyperlipidemia Father     Breast Cancer Mother     Kidney Cancer Brother        Social History     Tobacco Use    Smoking status: Never    Smokeless tobacco: Never   Substance Use Topics    Alcohol use: Yes     Alcohol/week: 0.0 standard drinks of alcohol     Comment: socially      No Known Allergies    Current Outpatient Medications   Medication Sig Dispense Refill    Loratadine (CLARITIN PO) Take 10 mg by mouth daily      multivitamin, therapeutic (THERA-VIT) TABS tablet Take 1 tablet by mouth daily               Objective    /82 (BP Location: Left arm, Patient Position: Sitting, Cuff Size: Adult Large)   Pulse 86   Temp 97.9  F (36.6  C) (Oral)   Resp 18   Ht 1.93 m (6' 4\")   Wt 124.5 kg (274 lb 6.4 oz)   SpO2 98%   BMI 33.40 kg/m    Body mass index is 33.4 kg/m .  Physical Exam   GENERAL: alert and no distress  PSYCH: mentation appears normal, affect normal/bright          Signed Electronically by: Lluvia Hairston " MD Joseph

## 2024-04-21 LAB
ALBUMIN SERPL BCG-MCNC: 4.2 G/DL (ref 3.5–5.2)
ALP SERPL-CCNC: 88 U/L (ref 40–150)
ALT SERPL W P-5'-P-CCNC: 37 U/L (ref 0–70)
ANION GAP SERPL CALCULATED.3IONS-SCNC: 8 MMOL/L (ref 7–15)
AST SERPL W P-5'-P-CCNC: 28 U/L (ref 0–45)
BILIRUB SERPL-MCNC: 0.3 MG/DL
BUN SERPL-MCNC: 13.3 MG/DL (ref 6–20)
CALCIUM SERPL-MCNC: 9.1 MG/DL (ref 8.6–10)
CHLORIDE SERPL-SCNC: 105 MMOL/L (ref 98–107)
CREAT SERPL-MCNC: 0.86 MG/DL (ref 0.67–1.17)
DEPRECATED HCO3 PLAS-SCNC: 25 MMOL/L (ref 22–29)
EGFRCR SERPLBLD CKD-EPI 2021: >90 ML/MIN/1.73M2
GLUCOSE SERPL-MCNC: 104 MG/DL (ref 70–99)
POTASSIUM SERPL-SCNC: 4.1 MMOL/L (ref 3.4–5.3)
PROT SERPL-MCNC: 7.3 G/DL (ref 6.4–8.3)
SHBG SERPL-SCNC: 34 NMOL/L (ref 11–80)
SODIUM SERPL-SCNC: 138 MMOL/L (ref 135–145)
TSH SERPL DL<=0.005 MIU/L-ACNC: 1.54 UIU/ML (ref 0.3–4.2)

## 2024-04-23 LAB
TESTOST FREE SERPL-MCNC: 4.04 NG/DL
TESTOST SERPL-MCNC: 215 NG/DL (ref 240–950)

## 2024-04-24 ENCOUNTER — MYC MEDICAL ADVICE (OUTPATIENT)
Dept: FAMILY MEDICINE | Facility: CLINIC | Age: 49
End: 2024-04-24
Payer: COMMERCIAL

## 2024-04-24 DIAGNOSIS — R79.89 LOW SERUM TESTOSTERONE: Primary | ICD-10-CM

## 2024-04-25 NOTE — TELEPHONE ENCOUNTER
April Coming MD Joseph- See pt's Drakerhart message response.     Routed to PCP    Francia CURRAN RN   PAL (Patient Advocate Liaison)  [facility]

## 2024-04-26 RX ORDER — TESTOSTERONE CYPIONATE 200 MG/ML
200 INJECTION, SOLUTION INTRAMUSCULAR
Qty: 10 ML | Refills: 0 | Status: SHIPPED | OUTPATIENT
Start: 2024-04-26 | End: 2024-07-31

## 2024-05-10 ENCOUNTER — TELEPHONE (OUTPATIENT)
Dept: FAMILY MEDICINE | Facility: CLINIC | Age: 49
End: 2024-05-10
Payer: COMMERCIAL

## 2024-05-10 DIAGNOSIS — R79.89 LOW SERUM TESTOSTERONE: Primary | ICD-10-CM

## 2024-05-10 RX ORDER — NEEDLES, DISPOSABLE 25GX5/8"
NEEDLE, DISPOSABLE MISCELLANEOUS
Qty: 50 EACH | Refills: 0 | Status: SHIPPED | OUTPATIENT
Start: 2024-05-10 | End: 2024-07-31

## 2024-05-10 NOTE — TELEPHONE ENCOUNTER
Please send an order for syringes and needles for his testosterone.    [facility]   P:[phone]  F:[phone]    Thank You,  Veronica George  PhT  [facility] Float [facility]

## 2024-07-13 ENCOUNTER — HEALTH MAINTENANCE LETTER (OUTPATIENT)
Age: 49
End: 2024-07-13

## 2024-07-15 ENCOUNTER — VIRTUAL VISIT (OUTPATIENT)
Dept: FAMILY MEDICINE | Facility: CLINIC | Age: 49
End: 2024-07-15
Payer: COMMERCIAL

## 2024-07-15 DIAGNOSIS — R68.82 DECREASED LIBIDO: ICD-10-CM

## 2024-07-15 DIAGNOSIS — R79.89 LOW SERUM TESTOSTERONE: Primary | ICD-10-CM

## 2024-07-15 PROCEDURE — G2211 COMPLEX E/M VISIT ADD ON: HCPCS | Mod: 95 | Performed by: FAMILY MEDICINE

## 2024-07-15 PROCEDURE — 99213 OFFICE O/P EST LOW 20 MIN: CPT | Mod: 95 | Performed by: FAMILY MEDICINE

## 2024-07-15 NOTE — PROGRESS NOTES
"Dez is a 49 year old who is being evaluated via a billable video visit.    How would you like to obtain your AVS? MyChart  If the video visit is dropped, the invitation should be resent by: Text to cell phone: [phone]  Will anyone else be joining your video visit? No      Assessment & Plan     Low serum testosterone  Due for labs.  If patient is within the 500-600 mg range we will continue him on one injection every 14 days.  If he is below that, we will change him to one injection every 10 days. Regardless of the result, we will have him recheck labs in about 10 weeks.  - Testosterone Free and Total; Future    Decreased libido  As above  - Testosterone Free and Total; Future      The longitudinal plan of care for the diagnosis(es)/condition(s) as documented were addressed during this visit. Due to the added complexity in care, I will continue to support Dez in the subsequent management and with ongoing continuity of care.    BMI  Estimated body mass index is 33.4 kg/m  as calculated from the following:    Height as of 4/19/24: 1.93 m (6' 4\").    Weight as of 4/19/24: 124.5 kg (274 lb 6.4 oz).       See Patient Instructions    Subjective   Dez is a 49 year old, presenting for the following health issues:  Recheck Medication (Testosterone)        7/15/2024     4:48 PM   Additional Questions   Roomed by Pat COLEMAN MA     History of Present Illness       Reason for visit:  Follow up on testosteroneHe consumes 0 sweetened beverage(s) daily.He exercises with enough effort to increase his heart rate 20 to 29 minutes per day.  He exercises with enough effort to increase his heart rate 3 or less days per week.   He is taking medications regularly.     Last labs done in April 2024 indicated total testosterone of 215 and free testosterone of 4.04, both low.      Right now he is doing 1 injection every two weeks.  He has not noted any side effects.  Has not noticed significant improvements in energy levels, libido is " "somewhat improved but not much.  Last injection was done one week ago.    Serum testosterone should be measured midway between injections in men who are receiving testosterone enanthate or cypionate, and the value should be mid-normal, eg, 500 to 600 ng/dL (17.3 to 20.8 nmol/L). The dose should be reduced if higher values are obtained.         Objective    Vitals - Patient Reported  Weight (Patient Reported): 124.3 kg (274 lb)  Height (Patient Reported): 193 cm (6' 4\")  BMI (Based on Pt Reported Ht/Wt): 33.35  Vitals:  No vitals were obtained today due to virtual visit.    Physical Exam   GENERAL: alert and no distress  EYES: Eyes grossly normal to inspection.  No discharge or erythema, or obvious scleral/conjunctival abnormalities.  RESP: No audible wheeze, cough, or visible cyanosis.    SKIN: Visible skin clear. No significant rash, abnormal pigmentation or lesions.  NEURO: Cranial nerves grossly intact.  Mentation and speech appropriate for age.  PSYCH: Appropriate affect, tone, and pace of words    Office Visit on 04/19/2024   Component Date Value Ref Range Status    Sodium 04/19/2024 138  135 - 145 mmol/L Final    Reference intervals for this test were updated on 09/26/2023 to more accurately reflect our healthy population. There may be differences in the flagging of prior results with similar values performed with this method. Interpretation of those prior results can be made in the context of the updated reference intervals.     Potassium 04/19/2024 4.1  3.4 - 5.3 mmol/L Final    Carbon Dioxide (CO2) 04/19/2024 25  22 - 29 mmol/L Final    Anion Gap 04/19/2024 8  7 - 15 mmol/L Final    Urea Nitrogen 04/19/2024 13.3  6.0 - 20.0 mg/dL Final    Creatinine 04/19/2024 0.86  0.67 - 1.17 mg/dL Final    GFR Estimate 04/19/2024 >90  >60 mL/min/1.73m2 Final    Calcium 04/19/2024 9.1  8.6 - 10.0 mg/dL Final    Chloride 04/19/2024 105  98 - 107 mmol/L Final    Glucose 04/19/2024 104 (H)  70 - 99 mg/dL Final    Alkaline " Phosphatase 04/19/2024 88  40 - 150 U/L Final    Reference intervals for this test were updated on 11/14/2023 to more accurately reflect our healthy population. There may be differences in the flagging of prior results with similar values performed with this method. Interpretation of those prior results can be made in the context of the updated reference intervals.    AST 04/19/2024 28  0 - 45 U/L Final    Reference intervals for this test were updated on 6/12/2023 to more accurately reflect our healthy population. There may be differences in the flagging of prior results with similar values performed with this method. Interpretation of those prior results can be made in the context of the updated reference intervals.    ALT 04/19/2024 37  0 - 70 U/L Final    Reference intervals for this test were updated on 6/12/2023 to more accurately reflect our healthy population. There may be differences in the flagging of prior results with similar values performed with this method. Interpretation of those prior results can be made in the context of the updated reference intervals.      Protein Total 04/19/2024 7.3  6.4 - 8.3 g/dL Final    Albumin 04/19/2024 4.2  3.5 - 5.2 g/dL Final    Bilirubin Total 04/19/2024 0.3  <=1.2 mg/dL Final    TSH 04/19/2024 1.54  0.30 - 4.20 uIU/mL Final    Hemoglobin A1C 04/19/2024 5.2  0.0 - 5.6 % Final    Normal <5.7%   Prediabetes 5.7-6.4%    Diabetes 6.5% or higher     Note: Adopted from ADA consensus guidelines.    WBC Count 04/19/2024 5.4  4.0 - 11.0 10e3/uL Final    RBC Count 04/19/2024 5.09  4.40 - 5.90 10e6/uL Final    Hemoglobin 04/19/2024 15.8  13.3 - 17.7 g/dL Final    Hematocrit 04/19/2024 44.0  40.0 - 53.0 % Final    MCV 04/19/2024 86  78 - 100 fL Final    MCH 04/19/2024 31.0  26.5 - 33.0 pg Final    MCHC 04/19/2024 35.9  31.5 - 36.5 g/dL Final    RDW 04/19/2024 11.3  10.0 - 15.0 % Final    Platelet Count 04/19/2024 265  150 - 450 10e3/uL Final    Sex Hormone Binding Globulin  04/19/2024 34  11 - 80 nmol/L Final    Free Testosterone Calculated 04/19/2024 4.04  ng/dL Final    Male Viktor Ranges:  Viktor Stage I: Less than or equal to 0.37 ng/dL  Viktor Stage II: 0.03-2.1 ng/dL  Viktor Stage III: 0.10-9.8 ng/dL  Viktor Stage IV: 3.5-16.9 ng/dL  Viktor Stage V: 4.1-23.9 ng/dL    Testosterone Total 04/19/2024 215 (L)  240 - 950 ng/dL Final         Video-Visit Details    Type of service:  Video Visit   Originating Location (pt. Location): Home    Distant Location (provider location):  On-site  Platform used for Video Visit: Dario  Signed Electronically by: Lluvia Ruiz MD

## 2024-07-29 ENCOUNTER — LAB (OUTPATIENT)
Dept: LAB | Facility: CLINIC | Age: 49
End: 2024-07-29
Payer: COMMERCIAL

## 2024-07-29 DIAGNOSIS — Z13.220 SCREENING FOR HYPERLIPIDEMIA: Primary | ICD-10-CM

## 2024-07-29 DIAGNOSIS — R79.89 LOW SERUM TESTOSTERONE: ICD-10-CM

## 2024-07-29 DIAGNOSIS — E78.5 HYPERLIPIDEMIA: ICD-10-CM

## 2024-07-29 DIAGNOSIS — R68.82 DECREASED LIBIDO: ICD-10-CM

## 2024-07-29 LAB
CHOLEST SERPL-MCNC: 237 MG/DL
FASTING STATUS PATIENT QL REPORTED: NO
HDLC SERPL-MCNC: 39 MG/DL
LDLC SERPL CALC-MCNC: 169 MG/DL
NONHDLC SERPL-MCNC: 198 MG/DL
SHBG SERPL-SCNC: 29 NMOL/L (ref 11–80)
TRIGL SERPL-MCNC: 147 MG/DL

## 2024-07-29 PROCEDURE — 84403 ASSAY OF TOTAL TESTOSTERONE: CPT

## 2024-07-29 PROCEDURE — 84270 ASSAY OF SEX HORMONE GLOBUL: CPT

## 2024-07-29 PROCEDURE — 80061 LIPID PANEL: CPT

## 2024-07-29 PROCEDURE — 36415 COLL VENOUS BLD VENIPUNCTURE: CPT

## 2024-07-31 ENCOUNTER — MYC REFILL (OUTPATIENT)
Dept: FAMILY MEDICINE | Facility: CLINIC | Age: 49
End: 2024-07-31
Payer: COMMERCIAL

## 2024-07-31 DIAGNOSIS — R79.89 LOW SERUM TESTOSTERONE: ICD-10-CM

## 2024-07-31 DIAGNOSIS — Z79.890 ENCOUNTER FOR MONITORING TESTOSTERONE REPLACEMENT THERAPY: ICD-10-CM

## 2024-07-31 DIAGNOSIS — R79.89 LOW SERUM TESTOSTERONE: Primary | ICD-10-CM

## 2024-07-31 DIAGNOSIS — Z51.81 ENCOUNTER FOR MONITORING TESTOSTERONE REPLACEMENT THERAPY: ICD-10-CM

## 2024-07-31 LAB
TESTOST FREE SERPL-MCNC: 15.07 NG/DL
TESTOST SERPL-MCNC: 640 NG/DL (ref 240–950)

## 2024-08-02 RX ORDER — NEEDLES, DISPOSABLE 25GX5/8"
NEEDLE, DISPOSABLE MISCELLANEOUS
Qty: 50 EACH | Refills: 0 | Status: SHIPPED | OUTPATIENT
Start: 2024-08-02

## 2024-08-02 RX ORDER — TESTOSTERONE CYPIONATE 200 MG/ML
200 INJECTION, SOLUTION INTRAMUSCULAR
Qty: 10 ML | Refills: 0 | Status: SHIPPED | OUTPATIENT
Start: 2024-08-02

## 2024-08-05 ASSESSMENT — SLEEP AND FATIGUE QUESTIONNAIRES
HOW LIKELY ARE YOU TO NOD OFF OR FALL ASLEEP WHILE SITTING QUIETLY AFTER LUNCH WITHOUT ALCOHOL: SLIGHT CHANCE OF DOZING
HOW LIKELY ARE YOU TO NOD OFF OR FALL ASLEEP WHILE SITTING AND TALKING TO SOMEONE: WOULD NEVER DOZE
HOW LIKELY ARE YOU TO NOD OFF OR FALL ASLEEP IN A CAR, WHILE STOPPED FOR A FEW MINUTES IN TRAFFIC: WOULD NEVER DOZE
HOW LIKELY ARE YOU TO NOD OFF OR FALL ASLEEP WHEN YOU ARE A PASSENGER IN A CAR FOR AN HOUR WITHOUT A BREAK: SLIGHT CHANCE OF DOZING
HOW LIKELY ARE YOU TO NOD OFF OR FALL ASLEEP WHILE WATCHING TV: MODERATE CHANCE OF DOZING
HOW LIKELY ARE YOU TO NOD OFF OR FALL ASLEEP WHILE SITTING AND READING: MODERATE CHANCE OF DOZING
HOW LIKELY ARE YOU TO NOD OFF OR FALL ASLEEP WHILE LYING DOWN TO REST IN THE AFTERNOON WHEN CIRCUMSTANCES PERMIT: MODERATE CHANCE OF DOZING
HOW LIKELY ARE YOU TO NOD OFF OR FALL ASLEEP WHILE SITTING INACTIVE IN A PUBLIC PLACE: WOULD NEVER DOZE

## 2024-08-06 ENCOUNTER — VIRTUAL VISIT (OUTPATIENT)
Dept: SLEEP MEDICINE | Facility: CLINIC | Age: 49
End: 2024-08-06
Payer: COMMERCIAL

## 2024-08-06 VITALS — BODY MASS INDEX: 32.88 KG/M2 | WEIGHT: 270 LBS | HEIGHT: 76 IN

## 2024-08-06 DIAGNOSIS — R06.00 DYSPNEA AND RESPIRATORY ABNORMALITY: Primary | ICD-10-CM

## 2024-08-06 DIAGNOSIS — E66.09 CLASS 1 OBESITY DUE TO EXCESS CALORIES WITH BODY MASS INDEX (BMI) OF 32.0 TO 32.9 IN ADULT, UNSPECIFIED WHETHER SERIOUS COMORBIDITY PRESENT: ICD-10-CM

## 2024-08-06 DIAGNOSIS — Z72.820 LACK OF ADEQUATE SLEEP: ICD-10-CM

## 2024-08-06 DIAGNOSIS — E66.811 CLASS 1 OBESITY DUE TO EXCESS CALORIES WITH BODY MASS INDEX (BMI) OF 32.0 TO 32.9 IN ADULT, UNSPECIFIED WHETHER SERIOUS COMORBIDITY PRESENT: ICD-10-CM

## 2024-08-06 DIAGNOSIS — R06.89 DYSPNEA AND RESPIRATORY ABNORMALITY: Primary | ICD-10-CM

## 2024-08-06 DIAGNOSIS — I10 ESSENTIAL HYPERTENSION: ICD-10-CM

## 2024-08-06 DIAGNOSIS — R06.83 SNORING: ICD-10-CM

## 2024-08-06 DIAGNOSIS — G47.30 SLEEP APNEA, UNSPECIFIED TYPE: ICD-10-CM

## 2024-08-06 PROBLEM — R68.82 DECREASED LIBIDO: Chronic | Status: ACTIVE | Noted: 2024-07-15

## 2024-08-06 PROBLEM — R79.89 LOW SERUM TESTOSTERONE: Chronic | Status: ACTIVE | Noted: 2024-07-15

## 2024-08-06 PROCEDURE — 99204 OFFICE O/P NEW MOD 45 MIN: CPT | Mod: 95 | Performed by: PHYSICIAN ASSISTANT

## 2024-08-06 ASSESSMENT — PAIN SCALES - GENERAL: PAINLEVEL: NO PAIN (0)

## 2024-08-06 NOTE — PROGRESS NOTES
Virtual Visit Details    Type of service:  Video Visit     Originating Location (pt. Location): Home    Distant Location (provider location):  On-site  Platform used for Video Visit: [facility]    Outpatient Sleep Medicine Consultation:      Name: Dez Martin MRN# [id number]   Age: 49 year old YOB: 1975     Date of Consultation: August 6, 2024  Consultation is requested by: Lluvia Ruiz MD  [id number] [place] [address] Lluvia Ruiz  Primary care provider: Lluvia Gurrola       Reason for Sleep Consult:     Dez Martin is sent by Lluvia Ruiz for a sleep consultation regarding sleep apnea.    Patient s Reason for visit  Dez Bondschie main reason for visit: Snoring and fatigue.  Patient states problem(s) started: Seversl years ago.  Dez Bondschie's goals for this visit: Find a solution to sleep issues           Assessment and Plan:     Impression:  Patient has features and risk factors for possible obstructive sleep apnea including: loud snoring, witnessed apnea, non-refreshing sleep, daytime fatigue/sleepiness (ESS 8) and co-morbid HTN. The STOP-BANG score is 5/8. The pathophysiology, diagnosis and treatment of KALEIGH was discussed.   Plan:    1. Schedule a Home Sleep Apnea Testing to evaluate for obstructive sleep apnea.    2. He is interested in a mandibular advancement device for mild sleep apnea and CPAP for moderate to severe sleep apnea.    3. Recommend weight management.      Summary Recommendations:  Orders Placed This Encounter   Procedures    HST-Home Sleep Apnea Test - Noxturnal Returnable     Summary Counseling:    Sleep Testing Reviewed  Obstructive Sleep Apnea Reviewed  Complications of Untreated Sleep Apnea Reviewed      Medical Decision-making:   Educational materials provided in instructions    Total time spent reviewing medical records, history and physical examination, review of previous  testing and interpretation as well as documentation on this date:50 minutes    CC: April Luba Ruiz          History of Present Illness:     Past Sleep Evaluations:    SLEEP-WAKE SCHEDULE:     Work/School Days: Patient goes to school/work: No   Usually gets into bed at 10:00  Takes patient about Within minutes to fall asleep  Has trouble falling asleep Rarely nights per week  Wakes up in the middle of the night 2-3 times.  Wakes up due to Snorting self awake;Use the bathroom;Other  He has trouble falling back asleep Depends on what time i wake up times a week.   It usually takes Wothin minutes to get back to sleep  Patient is usually up at 7:30  Uses alarm: Yes    Weekends/Non-work Days/All Other Days:  Usually gets into bed at 12:00   Takes patient about Within minutes to fall asleep  Patient is usually up at 8:00  Uses alarm: No    Sleep Need  Patient gets  5-6 hours sleep on average. He does not feel refreshed all the time.    Patient thinks he needs about 8-9 hours sleep    Dez Martin prefers to sleep in this position(s): Side   Patient states they do the following activities in bed: Watch TV;Use phone, computer, or tablet    Naps  Patient takes a purposeful nap Never times a week and naps are usually   in duration  He feels better after a nap:    He dozes off unintentionally 3-4 days days per week  Patient has had a driving accident or near-miss due to sleepiness/drowsiness: No      SLEEP DISRUPTIONS:    Breathing/Snoring  Patient snores:Yes  Other people complain about his snoring: Yes  Patient has been told he stops breathing in his sleep:Yes  He has issues with the following: Morning headaches;Stuffy nose when you wake up    Movement:  Patient gets pain, discomfort, with an urge to move:  No  It happens when he is resting:  No  It happens more at night:  No  Patient has been told he kicks his legs at night:  No     Behaviors in Sleep:  Dez Martin has experienced the following  behaviors while sleeping:    He has experienced sudden muscle weakness during the day: No      Is there anything else you would like your sleep provider to know:        CAFFEINE AND OTHER SUBSTANCES:    Patient consumes caffeinated beverages per day:  None  Last caffeine use is usually:    List of any prescribed or over the counter stimulants that patient takes:    List of any prescribed or over the counter sleep medication patient takes:    List of previous sleep medications that patient has tried:    Patient drinks alcohol to help them sleep: No  Patient drinks alcohol near bedtime: Yes    Family History:  Patient has a family member been diagnosed with a sleep disorder: Yes (mom and a brother)  Sleep Apnea         SCALES:    EPWORTH SLEEPINESS SCALE         8/5/2024     5:42 PM    [place] Sleepiness Scale ( KOSTA Butler  9627-1753<br>ESS - [place]/English - Final version - 21 Nov 07 - [facility].)   Sitting and reading Moderate chance of dozing   Watching TV Moderate chance of dozing   Sitting, inactive in a public place (e.g. a theatre or a meeting) Would never doze   As a passenger in a car for an hour without a break Slight chance of dozing   Lying down to rest in the afternoon when circumstances permit Moderate chance of dozing   Sitting and talking to someone Would never doze   Sitting quietly after a lunch without alcohol Slight chance of dozing   In a car, while stopped for a few minutes in traffic Would never doze   Orleans Score (MC) 8   Orleans Score (Sleep) 8         INSOMNIA SEVERITY INDEX (GAEL)          8/5/2024     5:25 PM   Insomnia Severity Index (GAEL)   Difficulty falling asleep 1   Difficulty staying asleep 2   Problems waking up too early 2   How SATISFIED/DISSATISFIED are you with your CURRENT sleep pattern? 2   How NOTICEABLE to others do you think your sleep problem is in terms of impairing the quality of your life? 3   How WORRIED/DISTRESSED are you about your current sleep problem?  "2   To what extent do you consider your sleep problem to INTERFERE with your daily functioning (e.g. daytime fatigue, mood, ability to function at work/daily chores, concentration, memory, mood, etc.) CURRENTLY? 3   GAEL Total Score 15       Guidelines for Scoring/Interpretation:  Total score categories:  0-7 = No clinically significant insomnia   8-14 = Subthreshold insomnia   15-21 = Clinical insomnia (moderate severity)  22-28 = Clinical insomnia (severe)  Used via courtesy of www.Flimperealth.va.gov with permission from Nawaf Montes PhD., [facility]      STOP BANG         8/6/2024     3:06 PM   STOP BANG Questionnaire (  2008, the American Society of Anesthesiologists, Inc. [employer]   BMI Clinic: 32.87         GAD7         No data to display                  CAGE-AID         No data to display                CAGE-AID reprinted with permission from the [place] Medical Journal, YOBANI Hernandez and ASTRID Rios, \"Conjoint screening questionnaires for alcohol and drug abuse\" [place] Medical Journal 94: 135-140, 1995.      PATIENT HEALTH QUESTIONNAIRE-9 (PHQ - 9)         No data to display                Developed by Michelle Burrell, Samira Haas, Salo Feliciano and colleagues, with an educational yvette from [employer] No permission required to reproduce, translate, display or distribute.        Allergies:    No Known Allergies    Medications:    Current Outpatient Medications   Medication Sig Dispense Refill    Loratadine (CLARITIN PO) Take 10 mg by mouth daily      multivitamin, therapeutic (THERA-VIT) TABS tablet Take 1 tablet by mouth daily      Needle, Disp, (BD DISP NEEDLES) 22G X 1-1/2\" MISC Use one needle to inject 1 mL of testosterone every 14 days 50 each 0    Syringe/Needle, Disp, (BD LUER-LOCK SYRINGE) 18G X 1-1/2\" 3 ML MISC Use one syringe to draw up 1 mL of testosterone every 14 days 50 each 0    testosterone cypionate (DEPOTESTOSTERONE) 200 MG/ML injection " Inject 1 mL (200 mg) into the muscle every 14 days 10 mL 0       Problem List:  Patient Active Problem List    Diagnosis Date Noted    Low serum testosterone 07/15/2024     Priority: Medium    Decreased libido 07/15/2024     Priority: Medium    PAC (premature atrial contraction) 02/15/2022     Priority: Medium    Bilateral tinnitus 04/27/2016     Priority: Medium    Benign essential hypertension 03/14/2016     Priority: Medium    Hyperlipidemia 04/30/2013     Priority: Medium    Obesity, unspecified 04/30/2013     Priority: Medium        Past Medical/Surgical History:  No past medical history on file.  No past surgical history on file.    Social History:  Social History     Socioeconomic History    Marital status:      Spouse name: Not on file    Number of children: Not on file    Years of education: Not on file    Highest education level: Not on file   Occupational History    Occupation:    Tobacco Use    Smoking status: Never    Smokeless tobacco: Never   Vaping Use    Vaping status: Never Used   Substance and Sexual Activity    Alcohol use: Yes     Alcohol/week: 0.0 standard drinks of alcohol     Comment: socially    Drug use: No    Sexual activity: Yes     Partners: Female   Other Topics Concern    Not on file   Social History Narrative    Not on file     Social Determinants of Health     Financial Resource Strain: Not on file   Food Insecurity: Not on file   Transportation Needs: Not on file   Physical Activity: Not on file   Stress: Not on file   Social Connections: Not on file   Interpersonal Safety: Low Risk  (4/19/2024)    Interpersonal Safety     Do you feel physically and emotionally safe where you currently live?: Yes     Within the past 12 months, have you been hit, slapped, kicked or otherwise physically hurt by someone?: No     Within the past 12 months, have you been humiliated or emotionally abused in other ways by your partner or ex-partner?: No   Housing Stability: Not on file  "      Family History:  Family History   Problem Relation Age of Onset    Myocardial Infarction Father         age 50 and 65    Hyperlipidemia Father     Breast Cancer Mother     Kidney Cancer Brother        Review of Systems:  A complete review of systems reviewed by me is negative with the exeption of what has been mentioned in the history of present illness.  In the last TWO WEEKS have you experienced any of the following symptoms?  Fevers: No  Night Sweats: No  Weight Gain: No  Double Vision: No  Changes in Vision: No  Difficulty Breathing through Nose: No  Sore Throat in Morning: No  Dry Mouth in the Morning: No  Shortness of Breath Lying Flat: No  Shortness of Breath With Activity: No  Awakening with Shortness of Breath: No  Increased Cough: No  Heart Racing at Night: Yes  Swelling in Feet or Legs: No  Diarrhea at Night: No  Heartburn at Night: No  Urinating More than Once at Night: Yes  Losing Control of Urine at Night: No  Joint Pains at Night: No  Headaches in Morning: Yes  Weakness in Arms or Legs: No  Depressed Mood: No  Anxiety: No     Physical Examination:  Vitals: Ht 1.93 m (6' 4\")   Wt 122.5 kg (270 lb)   BMI 32.87 kg/m    BMI= Body mass index is 32.87 kg/m .           GENERAL APPEARANCE: alert and no distress  EYES: Eyes grossly normal to inspection  NECK: no asymmetry, masses, or scars  RESP: breathing is non-labored   NEURO: mentation intact and speech normal  PSYCH: affect normal/bright  Mallampati Class:   Tonsillar Stage:          Data: All pertinent previous laboratory data reviewed     Recent Labs   Lab Test 04/19/24  1514 11/13/18  0816    138   POTASSIUM 4.1 4.0   CHLORIDE 105 104   CO2 25 27   ANIONGAP 8 7   * 96   BUN 13.3 14   CR 0.86 0.99   CATHERINE 9.1 9.3       Recent Labs   Lab Test 04/19/24  1514   WBC 5.4   RBC 5.09   HGB 15.8   HCT 44.0   MCV 86   MCH 31.0   MCHC 35.9   RDW 11.3          Recent Labs   Lab Test 04/19/24  1514   PROTTOTAL 7.3   ALBUMIN 4.2   BILITOTAL " 0.3   ALKPHOS 88   AST 28   ALT 37       TSH   Date Value   04/19/2024 1.54 uIU/mL   03/14/2016 0.83 mU/L   10/05/2015 1.19 mU/L       KALA Mcmahon 8/6/2024

## 2024-08-06 NOTE — NURSING NOTE
Current patient location: [id number] [place] [id number]    Is the patient currently in the state of [place]? YES    Visit mode:VIDEO    If the visit is dropped, the patient can be reconnected by: VIDEO VISIT: Send to e-mail at: [email or web address]    Will anyone else be joining the visit? NO  (If patient encounters technical issues they should call [phone])    How would you like to obtain your AVS? MyChart    Are changes needed to the allergy or medication list? Pt stated no changes to allergies and Pt stated no med changes    Are refills needed on medications prescribed by this physician? NO    Rooming Documentation:  Assigned questionnaire(s) completed      Reason for visit: Consult    Helene BLACKBURN

## 2024-08-06 NOTE — PATIENT INSTRUCTIONS
"          MY TREATMENT INFORMATION FOR SLEEP APNEA-  Dez Martin    DOCTOR : KALA Mcmahon    Am I having a sleep study at a sleep center?  --->Due to normal delays, you will be contacted within 2-4 weeks to schedule    Am I having a home sleep study?  --->Watch the video for the device you are using:    -/drop off device-   https://www.FishBrain.com/watch?v=yGGFBdELGhk    -Disposable device sent out require phone/computer application-   https://www.FishBrain.com/watch?v=BCce_vbiwxE      Frequently asked questions:  1. What is Obstructive Sleep Apnea (KALEIGH)? KALEIGH is the most common type of sleep apnea. Apnea means, \"without breath.\"  Apnea is most often caused by narrowing or collapse of the upper airway as muscles relax during sleep.   Almost everyone has occasional apneas. Most people with sleep apnea have had brief interruptions at night frequently for many years.  The severity of sleep apnea is related to how frequent and severe the events are.   2. What are the consequences of KALEIGH? Symptoms include: feeling sleepy during the day, snoring loudly, gasping or stopping of breathing, trouble sleeping, and occasionally morning headaches or heartburn at night.  Sleepiness can be serious and even increase the risk of falling asleep while driving. Other health consequences may include development of high blood pressure and other cardiovascular disease in persons who are susceptible. Untreated KALEIHG  can contribute to heart disease, stroke and diabetes.   3. What are the treatment options? In most situations, sleep apnea is a lifelong disease that must be managed with daily therapy. Medications are not effective for sleep apnea and surgery is generally not considered until other therapies have been tried. Your treatment is your choice . Continuous Positive Airway (CPAP) works right away and is the therapy that is effective in nearly everyone. An oral device to hold your jaw forward is usually the next most " reliable option. Other options include postioning devices (to keep you off your back), weight loss, and surgery including a tongue pacing device. There is more detail about some of these options below.  4. Are my sleep studies covered by insurance? Although we will request verification of coverage, we advise you also check in advance of the study to ensure there is coverage.    Important tips for those choosing CPAP and similar devices  REMEMBER-IF YOU RECEIVE A CALL FROM  [phone]-->IT IS TO SETUP A DEVICE  For new devices, sign up for device BIANCA to monitor your device for your followup visits  We encourage you to utilize the [employer] bianca or website ( [email or web address] ) to monitor your therapy progress and share the data with your healthcare team when you discuss your sleep apnea.                                                    Know your equipment:  CPAP is continuous positive airway pressure that prevents obstructive sleep apnea by keeping the throat from collapsing while you are sleeping. In most cases, the device is  smart  and can slowly self-adjusts if your throat collapses and keeps a record every day of how well you are treated-this information is available to you and your care team.  BPAP is bilevel positive airway pressure that keeps your throat open and also assists each breath with a pressure boost to maintain adequate breathing.  Special kinds of BPAP are used in patients who have inadequate breathing from lung or heart disease. In most cases, the device is  smart  and can slowly self-adjusts to assist breathing. Like CPAP, the device keeps a record of how well you are treated.  Your mask is your connection to the device. You get to choose what feels most comfortable and the staff will help to make sure if fits. Here: are some examples of the different masks that are available: Magnetic mask aids may assist with use but there are safety issues that should be addressed when  considering with magnets* ( see end of discussion).       Key points to remember on your journey with sleep apnea:  Sleep study.  PAP devices often need to be adjusted during a sleep study to show that they are effective and adjusted right.  Good tips to remember: Try wearing just the mask during a quiet time during the day so your body adapts to wearing it. A humidifier is recommended for comfort in most cases to prevent drying of your nose and throat. Allergy medication from your provider may help you if you are having nasal congestion.  Getting settled-in. It takes more than one night for most of us to get used to wearing a mask. Try wearing just the mask during a quiet time during the day so your body adapts to wearing it. A humidifier is recommended for comfort in most cases. Our team will work with you carefully on the first day and will be in contact within 4 days and again at 2 and 4 weeks for advice and remote device adjustments. Your therapy is evaluated by the device each day.   Use it every night. The more you are able to sleep naturally for 7-8 hours, the more likely you will have good sleep and to prevent health risks or symptoms from sleep apnea. Even if you use it 4 hours it helps. Occasionally all of us are unable to use a medical therapy, in sleep apnea, it is not dangerous to miss one night.   Communicate. Call our skilled team on the number provided on the first day if your visit for problems that make it difficult to wear the device. Over 2 out of 3 patients can learn to wear the device long-term with help from our team. Remember to call our team or your sleep providers if you are unable to wear the device as we may have other solutions for those who cannot adapt to mask CPAP therapy. It is recommended that you sleep your sleep provider within the first 3 months and yearly after that if you are not having problems.   Use it for your health. We encourage use of CPAP masks during daytime quiet  periods to allow your face and brain to adapt to the sensation of CPAP so that it will be a more natural sensation to awaken to at night or during naps. This can be very useful during the first few weeks or months of adapting to CPAP though it does not help medically to wear CPAP during wakefulness and  should not be used as a strategy just to meet guidelines.  Take care of your equipment. Make sure you clean your mask and tubing using directions every day and that your filter and mask are replaced as recommended or if they are not working.     *Masks with magnets:  Updated Contraindications  Masks with magnetic components are contraindicated for use by patients where they, or anyone in close physical contact while using the mask, have the following:   Active medical implants that interact with magnets (i.e., pacemakers, implantable cardioverter defibrillators (ICD), neurostimulators, cerebrospinal fluid (CSF) shunts, insulin/infusion pumps)   Metallic implants/objects containing ferromagnetic material (i.e., aneurysm clips/flow disruption devices, embolic coils, stents, valves, electrodes, implants to restore hearing or balance with implanted magnets, ocular implants, metallic splinters in the eye)  Updated Warning  Keep the mask magnets at a safe distance of at least 6 inches (150 mm) away from implants or medical devices that may be adversely affected by magnetic interference. This warning applies to you or anyone in close physical contact with your mask. The magnets are in the frame and lower headgear clips, with a magnetic field strength of up to 400mT. When worn, they connect to secure the mask but may inadvertently detach while asleep.  Implants/medical devices, including those listed within contraindications, may be adversely affected if they change function under external magnetic fields or contain ferromagnetic materials that attract/repel to magnetic fields (some metallic implants, e.g., contact lenses  with metal, dental implants, metallic cranial plates, screws, shannon hole covers, and bone substitute devices). Consult your physician and  of your implant / other medical device for information on the potential adverse effects of magnetic fields.    BESIDES CPAP, WHAT OTHER THERAPIES ARE THERE?    Positioning Device  Positioning devices are generally used when sleep apnea is mild and only occurs on your back.This example shows a pillow that straps around the waist. It may be appropriate for those whose sleep study shows milder sleep apnea that occurs primarily when lying flat on one's back. Preliminary studies have shown benefit but effectiveness at home may need to be verified by a home sleep test. These devices are generally not covered by medical insurance.  Examples of devices that maintain sleeping on the back to prevent snoring and mild sleep apnea.    Belt type body positioner  http://Jelly Button Games/    Electronic reminder  http://nightshifttherapy.[employer]/            Oral Appliance  What is oral appliance therapy?  An oral appliance device fits on your teeth at night like a retainer used after having braces. The device is made by a specialized dentist and requires several visits over 1-2 months before a manufactured device is made to fit your teeth and is adjusted to prevent your sleep apnea. Once an oral device is working properly, snoring should be improved. A home sleep test may be recommended at that time if to determine whether the sleep apnea is adequately treated.       Some things to remember:  -Oral devices are often, but not always, covered by your medical insurance. Be sure to check with your insurance provider.   -If you are referred for oral therapy, you will be given a list of specialized dentists to consider or you may choose to visit the Web site of the American Academy of Dental Sleep Medicine  -Oral devices are less likely to work if you have severe sleep apnea or are extremely  overweight.     More detailed information  An oral appliance is a small acrylic device that fits over the upper and lower teeth  (similar to a retainer or a mouth guard). This device slightly moves jaw forward, which moves the base of the tongue forward, opens the airway, improves breathing for effective treat snoring and obstructive sleep apnea in perhaps 7 out of 10 people .  The best working devices are custom-made by a dental device  after a mold is made of the teeth 1, 2, 3.  When is an oral appliance indicated?  Oral appliance therapy is recommended as a first-line treatment for patients with primary snoring, mild sleep apnea, and for patients with moderate sleep apnea who prefer appliance therapy to use of CPAP4, 5. Severity of sleep apnea is determined by sleep testing and is based on the number of respiratory events per hour of sleep.   How successful is oral appliance therapy?  The success rate of oral appliance therapy in patients with mild sleep apnea is 75-80% while in patients with moderate sleep apnea it is 50-70%. The chance of success in patients with severe sleep apnea is 40-50%. The research also shows that oral appliances have a beneficial effect on the cardiovascular health of KALEIGH patients at the same magnitude as CPAP therapy7.  Oral appliances should be a second-line treatment in cases of severe sleep apnea, but if not completely successful then a combination therapy utilizing CPAP plus oral appliance therapy may be effective. Oral appliances tend to be effective in a broad range of patients although studies show that the patients who have the highest success are females, younger patients, those with milder disease, and less severe obesity. 3, 6.   Finding a dentist that practices dental sleep medicine  Specific training is available through the American Academy of Dental Sleep Medicine for dentists interested in working in the field of sleep. To find a dentist who is educated in  the field of sleep and the use of oral appliances, near you, visit the Web site of the American Academy of Dental Sleep Medicine.    References  1. Giovanny, et al. Objectively measured vs self-reported compliance during oral appliance therapy for sleep-disordered breathing. Chest 2013; 144(5): 8265-5666.  2. Ellie et al. Objective measurement of compliance during oral appliance therapy for sleep-disordered breathing. Thorax 2013; 68(1): 91-96.  3. Fabian et al. Mandibular advancement devices in 620 men and women with KALEIGH and snoring: tolerability and predictors of treatment success. Chest 2004; 125: 8414-9582.  4. Yvrose, et al. Oral appliances for snoring and KALEIGH: a review. Sleep 2006; 29: 244-262.  5. Joshua et al. Oral appliance treatment for KALEIGH: an update. J Clin Sleep Med 2014; 10(2): 215-227.  6. Ana María et al. Predictors of OSAH treatment outcome. J Dent Res 2007; 86: 5109-8053.      Weight Loss:   Your Body mass index is 32.87 kg/m .    Being overweight does not necessarily mean you will have health consequences.  Those who have BMI over 35 or over 27 with existing medical conditions carries greater risk.   Weight loss decreases severity of sleep apnea in most people with obesity. For those with mild obesity who have developed snoring with weight gain, even 15-30 pound weight loss can improve and occasionally milder eliminate sleep apnea.  Structured and life-long dietary and health habits are necessary to lose weight and keep healthier weight levels.     The Comprehensive Weight loss program offers all aspects of weight loss strategies including two Non-Surgical Weight Loss Programs: Medical Weight Management and our 24 Week Healthy Lifestyle Program:    Medical Weight Management: You will meet with a Medical Weight Management Provider, as well as a Registered Dietician. The program may include medication therapy, dietary education, recommended exercise and physical therapy programs,  monthly support group meetings, and possible psychological counseling. Follow up visits with the provider or dietician are scheduled based on your progress and needs.    24 Week Healthy Lifestyle Program: This unique program is designed to give you the support of weekly appointments and activities thru a 24-week period. It may include all of the components of the basic program (above), with the addition of 11 individual Health  Visits, 24-week access to the WebPT website for over 700 online classes, and monthly support group meetings. This program has an out-of-pocket expense of $499 to cover the items that can not be billed to insurance (health coaches and WebPT access), and is non-refundable/non-transferable (you may be able to use a Health Savings Account; ask your HSA provider). There may be an optional meal replacement plan prescribed as well.   Surgical management achieves meaningful long-term weight loss and improvement in health risks in most patients with more severe obesity.      Sleep Apnea Surgery:    Surgery for obstructive sleep apnea is considered generally only when other therapies fail to work. Surgery may be discussed with you if you are having a difficult time tolerating CPAP and or when there is an abnormal structure that requires surgical correction.  Nose and throat surgeries often enlarge the airway to prevent collapse.  Most of these surgeries create pain for 1-2 weeks and up to half of the most common surgeries are not effective throughout life.  You should carefully discuss the benefits and drawbacks to surgery with your sleep provider and surgeon to determine if it is the best solution for you.   More information  Surgery for KALEIGH is directed at areas that are responsible for narrowing or complete obstruction of the airway during sleep.  There are a wide range of procedures available to enlarge and/or stabilize the airway to prevent blockage of breathing in the three major  areas where it can occur: the palate, tongue, and nasal regions.  Successful surgical treatment depends on the accurate identification of the factors responsible for obstructive sleep apnea in each person.  A personalized approach is required because there is no single treatment that works well for everyone.  Because of anatomic variation, consultation with an examination by a sleep surgeon is a critical first step in determining what surgical options are best for each patient.  In some cases, examination during sedation may be recommended in order to guide the selection of procedures.  Patients will be counseled about risks and benefits as well as the typical recovery course after surgery. Surgery is typically not a cure for a person s KALEIGH.  However, surgery will often significantly improve one s KALEIGH severity (termed  success rate ).  Even in the absence of a cure, surgery will decrease the cardiovascular risk associated with OSA7; improve overall quality of life8 (sleepiness, functionality, sleep quality, etc).      Palate Procedures:  Patients with KALEIGH often have narrowing of their airway in the region of their tonsils and uvula.  The goals of palate procedures are to widen the airway in this region as well as to help the tissues resist collapse.  Modern palate procedure techniques focus on tissue conservation and soft tissue rearrangement, rather than tissue removal.  Often the uvula is preserved in this procedure. Residual sleep apnea is common in patient after pharyngoplasty with an average reduction in sleep apnea events of 33%2.      Tongue Procedures:  ExamWhile patients are awake, the muscles that surround the throat are active and keep this region open for breathing. These muscles relax during sleep, allowing the tongue and other structures to collapse and block breathing.  There are several different tongue procedures available.  Selection of a tongue base procedure depends on characteristics seen on  physical exam.  Generally, procedures are aimed at removing bulky tissues in this area or preventing the back of the tongue from falling back during sleep.  Success rates for tongue surgery range from 50-62%3.    Hypoglossal Nerve Stimulation:  Hypoglossal nerve stimulation has recently received approval from the [place] Food and Drug Administration for the treatment of obstructive sleep apnea.  This is based on research showing that the system was safe and effective in treating sleep apnea6.  Results showed that the median AHI score decreased 68%, from 29.3 to 9.0. This therapy uses an implant system that senses breathing patterns and delivers mild stimulation to airway muscles, which keeps the airway open during sleep.  The system consists of three fully implanted components: a small generator (similar in size to a pacemaker), a breathing sensor, and a stimulation lead.  Using a small handheld remote, a patient turns the therapy on before bed and off upon awakening.    Candidates for this device must be greater than 18 years of age, have moderate to severe obstructive sleep apnea with less than 25% central events  (AHI between 15-65), BMI less than 35, have tried CPAP/oral appliance for at least 8 weeks without success, and have appropriate upper airway anatomy (determined by a sleep endoscopy performed by Dr. Ron Kelley or Dr. Zeeshan Olmstead).    Nasal Procedures:  Nasal obstruction can interfere with nasal breathing during the day and night.  Studies have shown that relief of nasal obstruction can improve the ability of some patients to tolerate positive airway pressure therapy for obstructive sleep apnea1.  Treatment options include medications such as nasal saline, topical corticosteroid and antihistamine sprays, and oral medications such as antihistamines or decongestants. Non-surgical treatments can include external nasal dilators for selected patients. If these are not successful by themselves,  surgery can improve the nasal airway either alone or in combination with these other options.        Combination Procedures:  Combination of surgical procedures and other treatments may be recommended, particularly if patients have more than one area of narrowing or persistent positional disease.  The success rate of combination surgery ranges from 66-80%2,3.    References  Denise PALMER. The Role of the Nose in Snoring and Obstructive Sleep Apnoea: An Update.  Eur Arch Otorhinolaryngol. 2011; 268: 1365-73.   Sheryl SM; Joseph JA; Esther JR; Pallanch JF; Judy MB; John SG; Corry RODRIGUEZ. Surgical modifications of the upper airway for obstructive sleep apnea in adults: a systematic review and meta-analysis. SLEEP 2010;33(10):0197-2343. Tanesha GUTHIRE. Hypopharyngeal surgery in obstructive sleep apnea: an evidence-based medicine review.  Arch Otolaryngol Head Neck Surg. 2006 Feb;132(2):206-13.  Fan YH1, Gabo Y, Yon LIVAN. The efficacy of anatomically based multilevel surgery for obstructive sleep apnea. Otolaryngol Head Neck Surg. 2003 Oct;129(4):327-35.  Tanesha GUTHRIE, Goldberg A. Hypopharyngeal Surgery in Obstructive Sleep Apnea: An Evidence-Based Medicine Review. Arch Otolaryngol Head Neck Surg. 2006 Feb;132(2):206-13.  Vianey ALBA et al. Upper-Airway Stimulation for Obstructive Sleep Apnea.  N Engl J Med. 2014 Jan 9;370(2):139-49.  Ricardo Y et al. Increased Incidence of Cardiovascular Disease in Middle-aged Men with Obstructive Sleep Apnea. Am J Respir Crit Care Med; 2002 166: 159-165  Saez EM et al. Studying Life Effects and Effectiveness of Palatopharyngoplasty (SLEEP) study: Subjective Outcomes of Isolated Uvulopalatopharyngoplasty. Otolaryngol Head Neck Surg. 2011; 144: 623-631.        WHAT IF I ONLY HAVE SNORING?    Mandibular advancement devices, lateral sleep positioning, long-term weight loss and treatment of nasal allergies have been shown to improve snoring.  Exercising tongue muscles with a game  (https://FilterSure.Stilnest.[email or web address]/us/bianca/soundly-reduce-snoring/yz4566716508) or stimulating the tongue during the day with a device (https://doi.org/10.3390/era55033318) have improved snoring in some individuals.  https://[email or web address].[email or web address]/  https://www.sleepfoundation.org/best-anti-snoring-mouthpieces-and-mouthguards    Remember to Drive Safe... Drive Alive     Sleep health profoundly affects your health, mood, and your safety.  Thirty three percent of the population (one in three of us) is not getting enough sleep and many have a sleep disorder. Not getting enough sleep or having an untreated / undertreated sleep condition may make us sleepy without even knowing it. In fact, our driving could be dramatically impaired due to our sleep health. As your provider, here are some things I would like you to know about driving:     Here are some warning signs for impairment and dangerous drowsy driving:              -Having been awake more than 16 hours               -Looking tired               -Eyelid drooping              -Head nodding (it could be too late at this point)              -Driving for more than 30 minutes     Some things you could do to make the driving safer if you are experiencing some drowsiness:              -Stop driving and rest              -Call for transportation              -Make sure your sleep disorder is adequately treated     Some things that have been shown NOT to work when experiencing drowsiness while driving:              -Turning on the radio              -Opening windows              -Eating any  distracting  /  entertaining  foods (e.g., sunflower seeds, candy, or any other)              -Talking on the phone      Your decision may not only impact your life, but also the life of others. Please, remember to drive safe for yourself and all of us.           Your Body mass index is 32.87 kg/m .  Weight management is a personal decision.  If you are interested in exploring weight loss strategies,  the following discussion covers the approaches that may be successful. Body mass index (BMI) is one way to tell whether you are at a healthy weight, overweight, or obese. It measures your weight in relation to your height.  A BMI of 18.5 to 24.9 is in the healthy range. A person with a BMI of 25 to 29.9 is considered overweight, and someone with a BMI of 30 or greater is considered obese. More than two-thirds of American adults are considered overweight or obese.  Being overweight or obese increases the risk for further weight gain. Excess weight may lead to heart disease and diabetes.  Creating and following plans for healthy eating and physical activity may help you improve your health.  Weight control is part of healthy lifestyle and includes exercise, emotional health, and healthy eating habits. Careful eating habits lifelong are the mainstay of weight control. Though there are significant health benefits from weight loss, long-term weight loss with diet alone may be very difficult to achieve- studies show long-term success with dietary management in less than 10% of people. Attaining a healthy weight may be especially difficult to achieve in those with severe obesity. In some cases, medications, devices and surgical management might be considered.  What can you do?  If you are overweight or obese and are interested in methods for weight loss, you should discuss this with your provider.   Consider reducing daily calorie intake by 500 calories.   Keep a food journal.   Avoiding skipping meals, consider cutting portions instead.    Diet combined with exercise helps maintain muscle while optimizing fat loss. Strength training is particularly important for building and maintaining muscle mass. Exercise helps reduce stress, increase energy, and improves fitness. Increasing exercise without diet control, however, may not burn enough calories to loose weight.     Start walking three days a week 10-20 minutes at a  time  Work towards walking thirty minutes five days a week   Eventually, increase the speed of your walking for 1-2 minutes at time    In addition, we recommend that you review healthy lifestyles and methods for weight loss available through the National Institutes of Health patient information sites:  http://win.niddk.nih.gov/publications/index.htm    And look into health and wellness programs that may be available through your health insurance provider, employer, local community center, or [employer].

## 2024-11-18 ASSESSMENT — SLEEP AND FATIGUE QUESTIONNAIRES
HOW LIKELY ARE YOU TO NOD OFF OR FALL ASLEEP WHILE LYING DOWN TO REST IN THE AFTERNOON WHEN CIRCUMSTANCES PERMIT: MODERATE CHANCE OF DOZING
HOW LIKELY ARE YOU TO NOD OFF OR FALL ASLEEP WHILE SITTING QUIETLY AFTER LUNCH WITHOUT ALCOHOL: SLIGHT CHANCE OF DOZING
HOW LIKELY ARE YOU TO NOD OFF OR FALL ASLEEP WHEN YOU ARE A PASSENGER IN A CAR FOR AN HOUR WITHOUT A BREAK: SLIGHT CHANCE OF DOZING
HOW LIKELY ARE YOU TO NOD OFF OR FALL ASLEEP WHILE WATCHING TV: MODERATE CHANCE OF DOZING
HOW LIKELY ARE YOU TO NOD OFF OR FALL ASLEEP WHILE SITTING INACTIVE IN A PUBLIC PLACE: WOULD NEVER DOZE
HOW LIKELY ARE YOU TO NOD OFF OR FALL ASLEEP IN A CAR, WHILE STOPPED FOR A FEW MINUTES IN TRAFFIC: WOULD NEVER DOZE
HOW LIKELY ARE YOU TO NOD OFF OR FALL ASLEEP WHILE SITTING AND READING: SLIGHT CHANCE OF DOZING
HOW LIKELY ARE YOU TO NOD OFF OR FALL ASLEEP WHILE SITTING AND TALKING TO SOMEONE: WOULD NEVER DOZE

## 2024-11-19 ENCOUNTER — OFFICE VISIT (OUTPATIENT)
Dept: SLEEP MEDICINE | Facility: CLINIC | Age: 49
End: 2024-11-19
Attending: PHYSICIAN ASSISTANT
Payer: COMMERCIAL

## 2024-11-19 DIAGNOSIS — E66.09 CLASS 1 OBESITY DUE TO EXCESS CALORIES WITH BODY MASS INDEX (BMI) OF 32.0 TO 32.9 IN ADULT, UNSPECIFIED WHETHER SERIOUS COMORBIDITY PRESENT: ICD-10-CM

## 2024-11-19 DIAGNOSIS — R06.00 DYSPNEA AND RESPIRATORY ABNORMALITY: ICD-10-CM

## 2024-11-19 DIAGNOSIS — G47.30 SLEEP APNEA, UNSPECIFIED TYPE: ICD-10-CM

## 2024-11-19 DIAGNOSIS — R06.83 SNORING: ICD-10-CM

## 2024-11-19 DIAGNOSIS — Z72.820 LACK OF ADEQUATE SLEEP: ICD-10-CM

## 2024-11-19 DIAGNOSIS — I10 ESSENTIAL HYPERTENSION: ICD-10-CM

## 2024-11-19 DIAGNOSIS — E66.811 CLASS 1 OBESITY DUE TO EXCESS CALORIES WITH BODY MASS INDEX (BMI) OF 32.0 TO 32.9 IN ADULT, UNSPECIFIED WHETHER SERIOUS COMORBIDITY PRESENT: ICD-10-CM

## 2024-11-19 DIAGNOSIS — R06.89 DYSPNEA AND RESPIRATORY ABNORMALITY: ICD-10-CM

## 2024-11-19 NOTE — PROGRESS NOTES
Addended by: Saleem Steinberg on: 6/17/2022 04:54 PM     Modules accepted: Orders Pt is completing a home sleep test. Pt was instructed on how to put on the Noxturnal T3 device and associated equipment before going to bed and given the opportunity to practice putting it on before leaving the sleep center. Pt was reminded to bring the home sleep test kit back to the center tomorrow, at agreed upon time for download and reporting.   Neck circumference: 46 CM / 18 inches.

## 2024-11-20 ENCOUNTER — DOCUMENTATION ONLY (OUTPATIENT)
Dept: SLEEP MEDICINE | Facility: CLINIC | Age: 49
End: 2024-11-20
Attending: PHYSICIAN ASSISTANT
Payer: COMMERCIAL

## 2024-11-21 NOTE — PROGRESS NOTES
HST POST-STUDY QUESTIONNAIRE    What time did you go to bed?  9:30  How long do you think it took to fall asleep?  45minutes  What time did you wake up to start the day?  5:45  Did you get up during the night at all?  yes  If you woke up, do you remember approximately what time(s)? 1:45am 4:00am  Did you have any difficulty with the equipment?  No  Did you us any type of treatment with this study?  None  Was the head of the bed elevated? No  Did you sleep in a recliner?  No  Did you stop using CPAP at least 3 days before this test?  NA  Any other information you'd like us to know? none

## 2024-12-12 ENCOUNTER — DOCUMENTATION ONLY (OUTPATIENT)
Dept: SLEEP MEDICINE | Facility: CLINIC | Age: 49
End: 2024-12-12
Payer: COMMERCIAL

## 2024-12-12 DIAGNOSIS — G47.33 OSA (OBSTRUCTIVE SLEEP APNEA): ICD-10-CM

## 2024-12-12 DIAGNOSIS — G47.30 SLEEP APNEA, UNSPECIFIED TYPE: Primary | ICD-10-CM

## 2024-12-12 NOTE — PROGRESS NOTES
Patient was offered choice of vendor and chose [facility].  Patient Dez Martin was set up at [place] on December 12, 2024. Patient received a Resmed Airsense 10 Pressures were set at  6-16 cm H2O.   Patient s ramp is 5 cm H2O for Auto and FLEX/EPR is EPR, 2.  Patient received a Resmed Mask name: Airfit P10 Pillow mask size Small, Standard, heated tubing and heated humidifier.  Patient has the following compliance requirements: none  Patient has a follow up recommended with Abass Basha, PA-C. Tracy L Fahrenkamp

## 2024-12-16 ENCOUNTER — DOCUMENTATION ONLY (OUTPATIENT)
Dept: SLEEP MEDICINE | Facility: CLINIC | Age: 49
End: 2024-12-16
Payer: COMMERCIAL

## 2024-12-16 NOTE — PROGRESS NOTES
3 day Sleep therapy management telephone visit    Diagnostic AHI:    HST: 32.0        SUBJECTIVE: Patient reports doing well with CPAP. Patient denies any questions or concerns. Patient was given my contact information and instructed to reach out with any questions or concerns.       Order settings:  CPAP MIN CPAP MAX   6 cm H2O 16 cm H2O         Device settings:  CPAP MIN CPAP MAX EPR RESMED SOFT RESPONSE SETTING   6 cm  H20 16 cm  H20 2 OFF         Patient has the following upcoming sleep appts:  Future Sleep Appointments         Provider Department    1/27/2025 9:00 AM (Arrive by 8:45 AM) Yanira Mitchell PA [facility] [place]            Replacement device: No  STM ordered by provider: Yes     Total time spent on accessing and  interpreting remote patient PAP therapy data  10 minutes    Total time spent counseling, coaching  and reviewing PAP therapy data with patient  1 minutes

## 2025-01-06 ENCOUNTER — OFFICE VISIT (OUTPATIENT)
Dept: FAMILY MEDICINE | Facility: CLINIC | Age: 50
End: 2025-01-06
Payer: COMMERCIAL

## 2025-01-06 VITALS
RESPIRATION RATE: 16 BRPM | TEMPERATURE: 98.2 F | BODY MASS INDEX: 33.93 KG/M2 | WEIGHT: 278.6 LBS | HEART RATE: 70 BPM | SYSTOLIC BLOOD PRESSURE: 138 MMHG | HEIGHT: 76 IN | OXYGEN SATURATION: 97 % | DIASTOLIC BLOOD PRESSURE: 85 MMHG

## 2025-01-06 DIAGNOSIS — Z79.890 ENCOUNTER FOR MONITORING TESTOSTERONE REPLACEMENT THERAPY: ICD-10-CM

## 2025-01-06 DIAGNOSIS — R79.89 LOW SERUM TESTOSTERONE: Primary | ICD-10-CM

## 2025-01-06 DIAGNOSIS — Z12.11 SCREEN FOR COLON CANCER: ICD-10-CM

## 2025-01-06 DIAGNOSIS — Z51.81 ENCOUNTER FOR MONITORING TESTOSTERONE REPLACEMENT THERAPY: ICD-10-CM

## 2025-01-06 LAB
CHOLEST SERPL-MCNC: 219 MG/DL
FASTING STATUS PATIENT QL REPORTED: YES
HDLC SERPL-MCNC: 34 MG/DL
LDLC SERPL CALC-MCNC: 152 MG/DL
NONHDLC SERPL-MCNC: 185 MG/DL
SHBG SERPL-SCNC: 26 NMOL/L (ref 11–80)
TRIGL SERPL-MCNC: 167 MG/DL

## 2025-01-06 PROCEDURE — 80061 LIPID PANEL: CPT

## 2025-01-06 PROCEDURE — 84270 ASSAY OF SEX HORMONE GLOBUL: CPT

## 2025-01-06 PROCEDURE — 36415 COLL VENOUS BLD VENIPUNCTURE: CPT

## 2025-01-06 PROCEDURE — 84403 ASSAY OF TOTAL TESTOSTERONE: CPT

## 2025-01-06 PROCEDURE — 99213 OFFICE O/P EST LOW 20 MIN: CPT

## 2025-01-06 RX ORDER — TESTOSTERONE CYPIONATE 200 MG/ML
200 INJECTION, SOLUTION INTRAMUSCULAR
Qty: 10 ML | Refills: 0 | Status: SHIPPED | OUTPATIENT
Start: 2025-01-06 | End: 2025-01-09

## 2025-01-06 NOTE — PROGRESS NOTES
"  Assessment & Plan     (R79.89) Low serum testosterone  (primary encounter diagnosis)  (Z51.81,  Z79.890) Encounter for monitoring testosterone replacement therapy  Comment: overall doing well, tolerating. Last testosterone levels 640. Agree w/ last plan of care: if testosterone levels 500-600 will continue w/ current regimen every 14 days, if lower will increase to injections every 10 days. Will follow up on results and whether further direction is necessary. Patient fully understands and is agreeable with plan of care, at this point patient will follow up as needed unless acute concerns arise in the meantime.  Plan: testosterone cypionate (DEPOTESTOSTERONE) 200         MG/ML injection    (Z12.11) Screen for colon cancer  Comment: due, colonoscopy ordered.   Plan: Colonoscopy Screening  Referral     The longitudinal plan of care for the diagnosis(es)/condition(s) as documented were addressed during this visit. Due to the added complexity in care, I will continue to support Dez in the subsequent management and with ongoing continuity of care.    BMI  Estimated body mass index is 33.91 kg/m  as calculated from the following:    Height as of this encounter: 1.93 m (6' 4\").    Weight as of this encounter: 126.4 kg (278 lb 9.6 oz).     Subjective   Dez is a 49 year old, presenting for the following health issues:  Refill Request (Testosterone Cypionate )        1/6/2025     7:39 AM   Additional Questions   Roomed by Pat COLEMAN MA     History of Present Illness       Reason for visit:  Low testosterone    He eats 0-1 servings of fruits and vegetables daily.He consumes 0 sweetened beverage(s) daily.He exercises with enough effort to increase his heart rate 30 to 60 minutes per day.  He exercises with enough effort to increase his heart rate 4 days per week.   He is taking medications regularly.     -No concerns from testosterone injections  -Patient endorsing feeling a bit better  -He has also started using a " "CPAP for KALEIGH which seems to have also helped w/ his day time fatigue/tiredness.   -Denies any cardiac or respiratory red flags, no BP issues at home.     Review of Systems  Constitutional, cardiovascular, pulmonary, endocrine systems are negative, except as otherwise noted.      Objective    /85 (BP Location: Left arm, Patient Position: Sitting, Cuff Size: Adult Large)   Pulse 70   Temp 98.2  F (36.8  C) (Temporal)   Resp 16   Ht 1.93 m (6' 4\")   Wt 126.4 kg (278 lb 9.6 oz)   SpO2 97%   BMI 33.91 kg/m    Body mass index is 33.91 kg/m .  Physical Exam  Vitals and nursing note reviewed.   Constitutional:       Appearance: Normal appearance. He is well-developed. He is not ill-appearing or toxic-appearing.   Cardiovascular:      Rate and Rhythm: Normal rate.      Heart sounds: No murmur heard.     No friction rub. No gallop.   Pulmonary:      Effort: Pulmonary effort is normal. No respiratory distress.      Breath sounds: No stridor. No wheezing, rhonchi or rales.   Neurological:      Mental Status: He is alert.   Psychiatric:         Attention and Perception: Attention and perception normal.         Mood and Affect: Mood normal.         Speech: Speech normal.         Behavior: Behavior normal. Behavior is cooperative.         Thought Content: Thought content normal.         Judgment: Judgment normal.          Signed Electronically by: KAREN Martinez CNP    "

## 2025-01-08 LAB
TESTOST FREE SERPL-MCNC: 9.83 NG/DL
TESTOST SERPL-MCNC: 421 NG/DL (ref 240–950)

## 2025-01-09 RX ORDER — TESTOSTERONE CYPIONATE 200 MG/ML
200 INJECTION, SOLUTION INTRAMUSCULAR
Qty: 10 ML | Refills: 0 | Status: SHIPPED | OUTPATIENT
Start: 2025-04-09

## 2025-01-26 ASSESSMENT — SLEEP AND FATIGUE QUESTIONNAIRES
HOW LIKELY ARE YOU TO NOD OFF OR FALL ASLEEP WHILE SITTING INACTIVE IN A PUBLIC PLACE: WOULD NEVER DOZE
HOW LIKELY ARE YOU TO NOD OFF OR FALL ASLEEP WHILE SITTING AND TALKING TO SOMEONE: WOULD NEVER DOZE
HOW LIKELY ARE YOU TO NOD OFF OR FALL ASLEEP WHEN YOU ARE A PASSENGER IN A CAR FOR AN HOUR WITHOUT A BREAK: SLIGHT CHANCE OF DOZING
HOW LIKELY ARE YOU TO NOD OFF OR FALL ASLEEP IN A CAR, WHILE STOPPED FOR A FEW MINUTES IN TRAFFIC: WOULD NEVER DOZE
HOW LIKELY ARE YOU TO NOD OFF OR FALL ASLEEP WHILE SITTING AND READING: SLIGHT CHANCE OF DOZING
HOW LIKELY ARE YOU TO NOD OFF OR FALL ASLEEP WHILE LYING DOWN TO REST IN THE AFTERNOON WHEN CIRCUMSTANCES PERMIT: SLIGHT CHANCE OF DOZING
HOW LIKELY ARE YOU TO NOD OFF OR FALL ASLEEP WHILE WATCHING TV: SLIGHT CHANCE OF DOZING
HOW LIKELY ARE YOU TO NOD OFF OR FALL ASLEEP WHILE SITTING QUIETLY AFTER LUNCH WITHOUT ALCOHOL: SLIGHT CHANCE OF DOZING

## 2025-01-27 ENCOUNTER — VIRTUAL VISIT (OUTPATIENT)
Dept: SLEEP MEDICINE | Facility: CLINIC | Age: 50
End: 2025-01-27
Payer: COMMERCIAL

## 2025-01-27 VITALS — WEIGHT: 270 LBS | HEIGHT: 76 IN | BODY MASS INDEX: 32.88 KG/M2

## 2025-01-27 DIAGNOSIS — G47.33 OSA (OBSTRUCTIVE SLEEP APNEA): Primary | ICD-10-CM

## 2025-01-27 PROCEDURE — 98005 SYNCH AUDIO-VIDEO EST LOW 20: CPT | Performed by: PHYSICIAN ASSISTANT

## 2025-01-27 ASSESSMENT — PAIN SCALES - GENERAL: PAINLEVEL_OUTOF10: NO PAIN (0)

## 2025-01-27 NOTE — NURSING NOTE
Current patient location: [address] [id number]    Is the patient currently in the state of [place]? YES    Visit mode: VIDEO    If the visit is dropped, the patient can be reconnected by:VIDEO VISIT: Text to cell phone:   Telephone Information:   Mobile [phone]       Will anyone else be joining the visit? NO  (If patient encounters technical issues they should call [phone]    Are changes needed to the allergy or medication list? Pt stated no changes to allergies and Pt stated no med changes    Are refills needed on medications prescribed by this physician? YES    Rooming Documentation:  Questionnaire(s) completed    Reason for visit: RECHDEEPAK SALASF

## 2025-01-27 NOTE — PROGRESS NOTES
Virtual Visit Details    Type of service:  Video Visit     Originating Location (pt. Location): Home    Distant Location (provider location):  On-site  Platform used for Video Visit: [facility]    Sleep Apnea - Follow-up Visit:    Impression/Plan:  Severe obstructive sleep apnea-  Excellent CPAP compliance and AHI is well controlled on CPAP 6-16 cm/H20. Daytime symptoms are improved.   Continue current treatment.   Comprehensive DME order placed.     Dez Martin will follow up in about 1 year or sooner if any concerns.     21 minutes spent on day of encounter doing chart review,  history and exam, counseling, coordinating plan of care, documentation and further activities as noted above.       Yanira Mitchell PA-C  Sleep Medicine     History of Present Illness:  Chief Complaint   Patient presents with    RECHECK    CPAP Follow Up       Dez Martin presents for follow-up of their severe sleep apnea, managed with CPAP.     He initially presented with loud snoring, witnessed apnea, non-refreshing sleep, daytime fatigue/sleepiness (ESS 8) and co-morbid HTN.    Study Date: 11/19/2024   Weight: 270.0 lbs  The combined apnea/hypopnea index [AHI] of 32 events per hour with  70 per hour supine, 0  per hour prone, 0 per hour upright, 0  per hour left side, and 4 per hour right side.Sustained hypoxemia was present. Baseline oxygen saturation was 91. Time with saturation less than 89% was 26.8 minutes. Time with saturation less than 90% was 75.5 minutes. The lowest oxygen saturation was 80.0%.     Position: Percent of time spent: Supine 42%, prone 0%, upright 0%, on left 0%, on right 58%.    December 12, 2024. Patient received a Resmed Airsense 10 Pressures were set at  6-16 cm H2O    Do you use a CPAP Machine at home: (Patient-Rptd) Yes  Overall, on a scale of 0-10 how would you rate your CPAP (0 poor, 10 great): (Patient-Rptd) 8    What type of mask do you use: (Patient-Rptd) Nasal Pillow  Is your mask  comfortable: (Patient-Rptd) No  If not, why: (Patient-Rptd) At times it is too tight    Is your mask leaking: (Patient-Rptd) No  If yes, where do you feel it:    How many night per week does the mask leak (0-7):      Do you notice snoring with mask on: (Patient-Rptd) No  Do you notice gasping arousals with mask on: (Patient-Rptd) No  Are you having significant oral or nasal dryness: (Patient-Rptd) No  Is the pressure setting comfortable: (Patient-Rptd) Yes  If not, why:      What is your typical bedtime: (Patient-Rptd) 10:00  How long does it take you to go to sleep on PAP therapy: (Patient-Rptd) 10 minutes  What time do you typically get out of bed for the day: (Patient-Rptd) 6:15  How many hours on average per night are you using PAP therapy: (Patient-Rptd) 7  How many hours are you sleeping per night: (Patient-Rptd) 8  Do you feel well rested in the morning: (Patient-Rptd) Yes      ResMed   Auto-PAP 6.0 - 16.0 cmH2O 30 day usage data:    80% of days with > 4 hours of use. 2/30 days with no use.   Average use 375 minutes per day.   95%ile Leak 2.69 L/min.   CPAP 95% pressure 10.1 cm.   AHI 0.14 events per hour.       EPWORTH SLEEPINESS SCALE         1/26/2025     2:11 PM    Allenwood Sleepiness Scale ( KOSTA Butler  [id number]<br>ESS - [place]/English - Final version - 21 Nov 07 - [facility] [place].)   Sitting and reading Slight chance of dozing   Watching TV Slight chance of dozing   Sitting, inactive in a public place (e.g. a theatre or a meeting) Would never doze   As a passenger in a car for an hour without a break Slight chance of dozing   Lying down to rest in the afternoon when circumstances permit Slight chance of dozing   Sitting and talking to someone Would never doze   Sitting quietly after a lunch without alcohol Slight chance of dozing   In a car, while stopped for a few minutes in traffic Would never doze   Allenwood Score (MC) 5   Allenwood Score (Sleep) 5        Patient-reported       INSOMNIA SEVERITY  "INDEX (GAEL)          1/26/2025     2:08 PM   Insomnia Severity Index (GAEL)   Difficulty falling asleep 0   Difficulty staying asleep 1   Problems waking up too early 1   How SATISFIED/DISSATISFIED are you with your CURRENT sleep pattern? 1   How NOTICEABLE to others do you think your sleep problem is in terms of impairing the quality of your life? 1   How WORRIED/DISTRESSED are you about your current sleep problem? 0   To what extent do you consider your sleep problem to INTERFERE with your daily functioning (e.g. daytime fatigue, mood, ability to function at work/daily chores, concentration, memory, mood, etc.) CURRENTLY? 1   GAEL Total Score 5        Patient-reported       Guidelines for Scoring/Interpretation:  Total score categories:  0-7 = No clinically significant insomnia   8-14 = Subthreshold insomnia   15-21 = Clinical insomnia (moderate severity)  22-28 = Clinical insomnia (severe)  Used via courtesy of www.Showpitchealth.va.gov with permission from Nawaf Montes PhD., [facility]        Past medical/surgical history, family history, social history, medications and allergies were reviewed.        Problem List:  Patient Active Problem List    Diagnosis Date Noted    Low serum testosterone 07/15/2024     Priority: Medium    Decreased libido 07/15/2024     Priority: Medium    PAC (premature atrial contraction) 02/15/2022     Priority: Medium    Bilateral tinnitus 04/27/2016     Priority: Medium    Benign essential hypertension 03/14/2016     Priority: Medium    Hyperlipidemia 04/30/2013     Priority: Medium    Obesity, unspecified 04/30/2013     Priority: Medium        Ht 1.93 m (6' 4\")   Wt 122.5 kg (270 lb)   BMI 32.87 kg/m     "

## 2025-01-27 NOTE — PATIENT INSTRUCTIONS
Your Body mass index is 32.87 kg/m .  Weight management is a personal decision.  If you are interested in exploring weight loss strategies, the following discussion covers the approaches that may be successful. Body mass index (BMI) is one way to tell whether you are at a healthy weight, overweight, or obese. It measures your weight in relation to your height.  A BMI of 18.5 to 24.9 is in the healthy range. A person with a BMI of 25 to 29.9 is considered overweight, and someone with a BMI of 30 or greater is considered obese. More than two-thirds of American adults are considered overweight or obese.  Being overweight or obese increases the risk for further weight gain. Excess weight may lead to heart disease and diabetes.  Creating and following plans for healthy eating and physical activity may help you improve your health.  Weight control is part of healthy lifestyle and includes exercise, emotional health, and healthy eating habits. Careful eating habits lifelong are the mainstay of weight control. Though there are significant health benefits from weight loss, long-term weight loss with diet alone may be very difficult to achieve- studies show long-term success with dietary management in less than 10% of people. Attaining a healthy weight may be especially difficult to achieve in those with severe obesity. In some cases, medications, devices and surgical management might be considered.  What can you do?  If you are overweight or obese and are interested in methods for weight loss, you should discuss this with your provider.   Consider reducing daily calorie intake by 500 calories.   Keep a food journal.   Avoiding skipping meals, consider cutting portions instead.    Diet combined with exercise helps maintain muscle while optimizing fat loss. Strength training is particularly important for building and maintaining muscle mass. Exercise helps reduce stress, increase energy, and improves fitness. Increasing  exercise without diet control, however, may not burn enough calories to loose weight.     Start walking three days a week 10-20 minutes at a time  Work towards walking thirty minutes five days a week   Eventually, increase the speed of your walking for 1-2 minutes at time    In addition, we recommend that you review healthy lifestyles and methods for weight loss available through the National Institutes of Health patient information sites:  http://win.niddk.nih.gov/publications/index.htm    And look into health and wellness programs that may be available through your health insurance provider, employer, local community center, or [employer].

## 2025-04-09 DIAGNOSIS — R79.89 LOW SERUM TESTOSTERONE: ICD-10-CM

## 2025-04-10 RX ORDER — TESTOSTERONE CYPIONATE 200 MG/ML
200 INJECTION, SOLUTION INTRAMUSCULAR
Qty: 10 ML | Refills: 0 | OUTPATIENT
Start: 2025-04-10

## 2025-04-15 DIAGNOSIS — R79.89 LOW SERUM TESTOSTERONE: ICD-10-CM

## 2025-04-15 RX ORDER — TESTOSTERONE CYPIONATE 200 MG/ML
200 INJECTION, SOLUTION INTRAMUSCULAR
Qty: 10 ML | Refills: 0 | OUTPATIENT
Start: 2025-04-15

## 2025-04-16 DIAGNOSIS — R79.89 LOW SERUM TESTOSTERONE: ICD-10-CM

## 2025-04-16 RX ORDER — TESTOSTERONE CYPIONATE 200 MG/ML
200 INJECTION, SOLUTION INTRAMUSCULAR
Qty: 10 ML | Refills: 0 | Status: SHIPPED | OUTPATIENT
Start: 2025-04-16

## 2025-04-17 NOTE — TELEPHONE ENCOUNTER
RN received call from patient. Patient is asking if he can get a refill for testosterone prior to his June appointment. Patient has lab only appointment 4/21/25 to check testosterone levels. Patient was seen for Office visit with Guille Tucker CNP 1/6/25 r/t low serum testosterone.     Patient reports he took last dose yesterday. Patient is supposed to take every 10 days. Patient states he picked up 1mL from the pharmacy the other day and didn't have any refills.     Per chart review, script for testosterone cypionate (DEPOTESTOSTERONE) 200 MG/ML injection was sent to pharmacy 4/16/25 for dispense amt 10mL. Patient should have meds on file to last until June. Patient will check with pharmacy again and let us know if there are further issues.     Roscoe DEVRIES RN 4/17/2025 at 10:25 AM

## 2025-04-21 ENCOUNTER — LAB (OUTPATIENT)
Dept: LAB | Facility: CLINIC | Age: 50
End: 2025-04-21
Payer: COMMERCIAL

## 2025-04-21 DIAGNOSIS — I10 BENIGN ESSENTIAL HYPERTENSION: Primary | ICD-10-CM

## 2025-04-21 DIAGNOSIS — R79.89 LOW SERUM TESTOSTERONE: ICD-10-CM

## 2025-04-21 PROCEDURE — 84403 ASSAY OF TOTAL TESTOSTERONE: CPT

## 2025-04-21 PROCEDURE — 36415 COLL VENOUS BLD VENIPUNCTURE: CPT

## 2025-04-21 PROCEDURE — 80048 BASIC METABOLIC PNL TOTAL CA: CPT

## 2025-04-21 PROCEDURE — 84270 ASSAY OF SEX HORMONE GLOBUL: CPT

## 2025-04-22 LAB
ANION GAP SERPL CALCULATED.3IONS-SCNC: 9 MMOL/L (ref 7–15)
BUN SERPL-MCNC: 11.4 MG/DL (ref 6–20)
CALCIUM SERPL-MCNC: 9.1 MG/DL (ref 8.8–10.4)
CHLORIDE SERPL-SCNC: 102 MMOL/L (ref 98–107)
CREAT SERPL-MCNC: 1.17 MG/DL (ref 0.67–1.17)
EGFRCR SERPLBLD CKD-EPI 2021: 76 ML/MIN/1.73M2
GLUCOSE SERPL-MCNC: 84 MG/DL (ref 70–99)
HCO3 SERPL-SCNC: 27 MMOL/L (ref 22–29)
POTASSIUM SERPL-SCNC: 4.3 MMOL/L (ref 3.4–5.3)
SHBG SERPL-SCNC: 23 NMOL/L (ref 11–80)
SODIUM SERPL-SCNC: 138 MMOL/L (ref 135–145)

## 2025-04-23 LAB
TESTOST FREE SERPL-MCNC: 35.09 NG/DL
TESTOST SERPL-MCNC: 1186 NG/DL (ref 240–950)

## 2025-04-24 ENCOUNTER — TELEPHONE (OUTPATIENT)
Dept: FAMILY MEDICINE | Facility: CLINIC | Age: 50
End: 2025-04-24
Payer: COMMERCIAL

## 2025-04-24 DIAGNOSIS — R79.89 LOW SERUM TESTOSTERONE: ICD-10-CM

## 2025-04-24 NOTE — TELEPHONE ENCOUNTER
Guille Tucker APRN CNP  P [facility]  Can we call patient to ask if he had given himself a testosterone injection within a few days of getting lab done? If not patient may need adjustment on testosterone as it returned back elevated.    KAREN Martinez CNP

## 2025-04-24 NOTE — TELEPHONE ENCOUNTER
Guille Tucker APRN CNP    Please see requested information below in bold     RN spoke to patient     Last testosterone injection given on 4/16/2025 lab completed on 4/21/2025     RN advised patient we will return call if dose adjustment needed     Phuong Oliva, Registered Nurse  [facility]

## 2025-04-28 RX ORDER — TESTOSTERONE CYPIONATE 200 MG/ML
200 INJECTION, SOLUTION INTRAMUSCULAR
Qty: 10 ML | Refills: 0 | Status: SHIPPED | OUTPATIENT
Start: 2025-04-28

## 2025-04-28 NOTE — TELEPHONE ENCOUNTER
Dr. Marika Ruiz,  Any recommendations on testosterone w/ recent level being elevated at 1,186? Leave at 200 mg or decrease? He had injection 5 days prior to testing.     KAREN Martinez CNP

## 2025-04-28 NOTE — TELEPHONE ENCOUNTER
Back him out to every 2 weeks instead of 10 days for 8 weeks and recheck at week 9 (correction between injections0.

## 2025-04-28 NOTE — TELEPHONE ENCOUNTER
RN spoke to patient     Reviewed message below from Guille JONES CNP  and Dr. Marika Ruiz     Patient will complete his testosterone injection every 14 days   Advised new order sent to [facility] pharmacy   Lab only appt scheduled as below     Jul 07, 2025 9:15 AM  LAB with CR LAB  [facility] ([facility] [place] ) [phone]     No questions at this time     Phuong Oliva, Registered Nurse  [facility]

## 2025-05-13 ENCOUNTER — OFFICE VISIT (OUTPATIENT)
Dept: FAMILY MEDICINE | Facility: CLINIC | Age: 50
End: 2025-05-13
Payer: COMMERCIAL

## 2025-05-13 VITALS
OXYGEN SATURATION: 95 % | WEIGHT: 276.6 LBS | BODY MASS INDEX: 33.68 KG/M2 | HEART RATE: 72 BPM | SYSTOLIC BLOOD PRESSURE: 140 MMHG | RESPIRATION RATE: 20 BRPM | DIASTOLIC BLOOD PRESSURE: 82 MMHG | TEMPERATURE: 97.9 F | HEIGHT: 76 IN

## 2025-05-13 DIAGNOSIS — L98.9 SKIN LESION: Primary | ICD-10-CM

## 2025-05-13 DIAGNOSIS — R03.0 ELEVATED BLOOD PRESSURE READING WITHOUT DIAGNOSIS OF HYPERTENSION: ICD-10-CM

## 2025-05-13 PROCEDURE — 99213 OFFICE O/P EST LOW 20 MIN: CPT | Performed by: PHYSICIAN ASSISTANT

## 2025-05-13 PROCEDURE — 3079F DIAST BP 80-89 MM HG: CPT | Performed by: PHYSICIAN ASSISTANT

## 2025-05-13 PROCEDURE — 3077F SYST BP >= 140 MM HG: CPT | Performed by: PHYSICIAN ASSISTANT

## 2025-05-13 PROCEDURE — 1126F AMNT PAIN NOTED NONE PRSNT: CPT | Performed by: PHYSICIAN ASSISTANT

## 2025-05-13 ASSESSMENT — PAIN SCALES - GENERAL: PAINLEVEL_OUTOF10: NO PAIN (0)

## 2025-05-13 NOTE — PROGRESS NOTES
"  Assessment & Plan     Skin lesion  Growing, though appears benign. I am worried given the size and the location so close to the opening to his ear canal. Referral to dermatology placed to evaluate and consider removal.  - Adult Dermatology  Referral; Future    Elevated blood pressure reading without diagnosis of hypertension  High on recheck as well. DASH diet provided on AVS      Subjective   Dez is a 50 year old, presenting for the following health issues:  Ear Problem (Growth in ear has gotten larger)        5/13/2025    10:10 AM   Additional Questions   Roomed by christopher ying   Accompanied by self     Ear Problem    History of Present Illness       Reason for visit:  Mole in left ear  Symptom intensity:  Mild  Symptom progression:  Worsening  Had these symptoms before:  No   He is taking medications regularly.        Patient has a \"mole\" on his left ear that has gotten bigger.        Objective    BP (!) 140/82   Pulse 72   Temp 97.9  F (36.6  C) (Oral)   Resp 20   Ht 1.93 m (6' 4\")   Wt 125.5 kg (276 lb 9.6 oz)   SpO2 95%   BMI 33.67 kg/m    Body mass index is 33.67 kg/m .  Physical Exam   GENERAL: No acute distress  HEENT: Normocephalic  SKIN: Skin colored papule in the left external ear giorgi and opening to the canal with extension into the ear canal, largest portion about 8 mm in size but with extension into the canal difficulty with measuring.  NEURO: Alert and non-focal          Signed Electronically by: Ping Londono PA-C    "

## 2025-05-14 ENCOUNTER — TELEPHONE (OUTPATIENT)
Dept: DERMATOLOGY | Facility: CLINIC | Age: 50
End: 2025-05-14
Payer: COMMERCIAL

## 2025-05-14 NOTE — TELEPHONE ENCOUNTER
This encounter is being sent to inform the clinic that this patient has a referral from Ping Londono for the diagnoses of Skin lesion [L98.9] and has requested that this patient be seen within Priority: 1-2 Weeks. Based on the availability of our provider(s), we are unable to accommodate this request.    Were all sites offered this patient?  Yes    Does scheduling algorithm request to schedule next available?  Patient has been scheduled for the first available opening with Ely Patten on 12/18/25.  We have informed the patient that the clinic will review their referral and reach out if a sooner appointment is medically necessary.

## 2025-05-14 NOTE — TELEPHONE ENCOUNTER
Patient has an appointment for this scheduled in Sept 2025.    Spoke with patient and scheduled then in a spot check only appointment slot. Informed the patient that this will be a spot check only appointment and that is the reason we are able to get them in sooner. We will not be able to address any other concern, including a full body skin check. If the patient has other concerns, they will need to make another appointment to have those addressed. Verbal understanding from the patient.    Patient is keeping appointment 09/25 for a full body skin check     Meagan COBB CMA

## 2025-06-24 ENCOUNTER — OFFICE VISIT (OUTPATIENT)
Dept: DERMATOLOGY | Facility: CLINIC | Age: 50
End: 2025-06-24
Attending: PHYSICIAN ASSISTANT
Payer: COMMERCIAL

## 2025-06-24 DIAGNOSIS — L98.9 SKIN LESION: ICD-10-CM

## 2025-06-24 DIAGNOSIS — L82.1 SEBORRHEIC KERATOSES: ICD-10-CM

## 2025-06-24 DIAGNOSIS — D22.9 MULTIPLE BENIGN NEVI: ICD-10-CM

## 2025-06-24 DIAGNOSIS — L73.9 FOLLICULITIS: Primary | ICD-10-CM

## 2025-06-24 ASSESSMENT — PAIN SCALES - GENERAL: PAINLEVEL_OUTOF10: NO PAIN (0)

## 2025-06-24 NOTE — NURSING NOTE
Dermatology Rooming Note    Dez Martin's goals for this visit include:   Chief Complaint   Patient presents with    Derm Problem     L ear, pt stated wife first noticed 9 months ago, changing in size. No pain, bleeding or crusting. Both parents hx of skin cancer (unknown type)     Sarah Colón - EMT

## 2025-06-24 NOTE — PROGRESS NOTES
"[facility] Dermatology Note  Encounter Date: Jun 24, 2025  Office Visit     Dermatology Problem List:  1. ***    ____________________________________________    Assessment & Plan:    # {Diagnosesderm:779439}.   -Skin counseling for melanoma including: ABCDEs: Counseled ABCDEs of melanoma: Asymmetry, Border (irregularity), Color (not uniform, changes in color), Diameter (greater than 6 mm which is about the size of a pencil eraser), and Evolving (any changes in preexisting moles).Sun protection: Counseled SPF30+ sunscreen, UPF clothing, sun avoidance, tanning bed avoidance. First degree relatives are recommended oral exams along with regular genital, dental and eye exams.        # {Diagnosesderm:031280}.   {kkplans:142320}   - ***     Procedures Performed:   {bsproceduresnotes:290403}  {kkproceduremedstudent:663615}    Follow-up: {kkfollowup:569510}    Staff and Medical Student:     Artem Simon MS4    ***  ____________________________________________    CC: No chief complaint on file.    HPI:  Mr. Dez Martin is a(n) 50 year old male who presents today as a new patient for growing mole on L ear.    Patient is otherwise feeling well, without additional skin concerns.    Labs:  None reviewed.    Physical Exam:  Vitals: There were no vitals taken for this visit.  SKIN: Focused examination of face and ears was performed.  - ***  - ***  - ***  - No other lesions of concern on areas examined.     Medications:  Current Outpatient Medications   Medication Sig Dispense Refill    Loratadine (CLARITIN PO) Take 10 mg by mouth daily      multivitamin, therapeutic (THERA-VIT) TABS tablet Take 1 tablet by mouth daily      Needle, Disp, (BD DISP NEEDLES) 22G X 1-1/2\" MISC Use one needle to inject 1 mL of testosterone every 14 days 50 each 0    Syringe/Needle, Disp, (BD LUER-LOCK SYRINGE) 18G X 1-1/2\" 3 ML MISC Use one syringe to draw up 1 mL of testosterone every 14 days 50 each 0    testosterone " cypionate (DEPOTESTOSTERONE) 200 MG/ML injection Inject 1 mL (200 mg) into the muscle every 14 days. 10 mL 0     No current facility-administered medications for this visit.      Past Medical History:   Patient Active Problem List   Diagnosis    Benign essential hypertension    Bilateral tinnitus    PAC (premature atrial contraction)    Hyperlipidemia    Obesity, unspecified    Low serum testosterone    Decreased libido     No past medical history on file.     CC Ping Londono PA-C  [id number] [phone] on close of this encounter.

## 2025-06-24 NOTE — PROGRESS NOTES
[facility] Dermatology Note    Encounter Date: Jun 24, 2025    Dermatology Problem List:    ______________________________________    Impression/Plan:  Dez was seen today for derm problem.    Diagnoses and all orders for this visit:    Folliculitis  - benign  - declines prescription tx     Skin lesion  -     Adult Dermatology  Referral    Multiple benign nevi  - benign    Seborrheic keratoses  - L ear  - benign      Follow-up PRN.       Staff Involved:  Staff Only    Jw Evans MD   of Dermatology  Department of Dermatology  [facility]      CC:   Chief Complaint   Patient presents with    Derm Problem     L ear, pt stated wife first noticed 9 months ago, changing in size. No pain, bleeding or crusting. Both parents hx of skin cancer (unknown type)       History of Present Illness:  Mr. Dez Martin is a 50 year old male who presents as a new patient.    Presents for spot in ear noticed by wife who is a nurse     Labs:      Physical exam:  Vitals: There were no vitals taken for this visit.  GEN: well developed, well-nourished, in no acute distress, in a pleasant mood.     SKIN: Jacobson phototype 1  - Sun-exposed skin, which includes the head/face, neck, both arms, digits, and/or nails was examined.   - heath follicular erythema w/ some pustules scattered on chest and back  - Flat brown macules and patches in a sun exposed areas on face and extremities  - Stuck on brown papules on trunk and extremities   - No other lesions of concern on areas examined.     Past Medical History:   History reviewed. No pertinent past medical history.  History reviewed. No pertinent surgical history.    Social History:   reports that he has never smoked. He has never used smokeless tobacco. He reports current alcohol use. He reports that he does not use drugs.    Family History:  Family History   Problem Relation Age of Onset    Myocardial  "Infarction Father         age 50 and 65    Hyperlipidemia Father     Breast Cancer Mother     Kidney Cancer Brother        Medications:  Current Outpatient Medications   Medication Sig Dispense Refill    Loratadine (CLARITIN PO) Take 10 mg by mouth daily      multivitamin, therapeutic (THERA-VIT) TABS tablet Take 1 tablet by mouth daily      Needle, Disp, (BD DISP NEEDLES) 22G X 1-1/2\" MISC Use one needle to inject 1 mL of testosterone every 14 days 50 each 0    Syringe/Needle, Disp, (BD LUER-LOCK SYRINGE) 18G X 1-1/2\" 3 ML MISC Use one syringe to draw up 1 mL of testosterone every 14 days 50 each 0    testosterone cypionate (DEPOTESTOSTERONE) 200 MG/ML injection Inject 1 mL (200 mg) into the muscle every 14 days. 10 mL 0     No Known Allergies              "

## 2025-06-24 NOTE — LETTER
6/24/2025       RE: Dez Martin  [address] [facility] [id number]     Dear Colleague,    Thank you for referring your patient, Dez Martin, to the [facility] [place] at [facility]. Please see a copy of my visit note below.    [facility] Dermatology Note    Encounter Date: Jun 24, 2025    Dermatology Problem List:    ______________________________________    Impression/Plan:  Dez was seen today for derm problem.    Diagnoses and all orders for this visit:    Folliculitis  - benign  - declines prescription tx     Skin lesion  -     Adult Dermatology  Referral    Multiple benign nevi  - benign    Seborrheic keratoses  - L ear  - benign      Follow-up PRN.       Staff Involved:  Staff Only    Jw Evans MD   of Dermatology  Department of Dermatology  [facility]      CC:   Chief Complaint   Patient presents with     Derm Problem     L ear, pt stated wife first noticed 9 months ago, changing in size. No pain, bleeding or crusting. Both parents hx of skin cancer (unknown type)       History of Present Illness:  Mr. Dez Martin is a 50 year old male who presents as a new patient.    Presents for spot in ear noticed by wife who is a nurse     Labs:      Physical exam:  Vitals: There were no vitals taken for this visit.  GEN: well developed, well-nourished, in no acute distress, in a pleasant mood.     SKIN: Jacobson phototype 1  - Sun-exposed skin, which includes the head/face, neck, both arms, digits, and/or nails was examined.   - heath follicular erythema w/ some pustules scattered on chest and back  - Flat brown macules and patches in a sun exposed areas on face and extremities  - Stuck on brown papules on trunk and extremities   - No other lesions of concern on areas examined.     Past Medical History:   History  "reviewed. No pertinent past medical history.  History reviewed. No pertinent surgical history.    Social History:   reports that he has never smoked. He has never used smokeless tobacco. He reports current alcohol use. He reports that he does not use drugs.    Family History:  Family History   Problem Relation Age of Onset     Myocardial Infarction Father         age 50 and 65     Hyperlipidemia Father      Breast Cancer Mother      Kidney Cancer Brother        Medications:  Current Outpatient Medications   Medication Sig Dispense Refill     Loratadine (CLARITIN PO) Take 10 mg by mouth daily       multivitamin, therapeutic (THERA-VIT) TABS tablet Take 1 tablet by mouth daily       Needle, Disp, (BD DISP NEEDLES) 22G X 1-1/2\" MISC Use one needle to inject 1 mL of testosterone every 14 days 50 each 0     Syringe/Needle, Disp, (BD LUER-LOCK SYRINGE) 18G X 1-1/2\" 3 ML MISC Use one syringe to draw up 1 mL of testosterone every 14 days 50 each 0     testosterone cypionate (DEPOTESTOSTERONE) 200 MG/ML injection Inject 1 mL (200 mg) into the muscle every 14 days. 10 mL 0     No Known Allergies                Again, thank you for allowing me to participate in the care of your patient.      Sincerely,    Jw Evans MD    "

## 2025-06-30 ENCOUNTER — LAB (OUTPATIENT)
Dept: LAB | Facility: CLINIC | Age: 50
End: 2025-06-30
Payer: COMMERCIAL

## 2025-06-30 ENCOUNTER — RESULTS FOLLOW-UP (OUTPATIENT)
Dept: FAMILY MEDICINE | Facility: CLINIC | Age: 50
End: 2025-06-30

## 2025-06-30 DIAGNOSIS — Z11.4 SCREENING FOR HIV (HUMAN IMMUNODEFICIENCY VIRUS): ICD-10-CM

## 2025-06-30 DIAGNOSIS — Z11.59 NEED FOR HEPATITIS C SCREENING TEST: ICD-10-CM

## 2025-06-30 DIAGNOSIS — R79.89 LOW SERUM TESTOSTERONE: ICD-10-CM

## 2025-06-30 LAB
HCV AB SERPL QL IA: NONREACTIVE
HIV 1+2 AB+HIV1 P24 AG SERPL QL IA: NONREACTIVE
SHBG SERPL-SCNC: 25 NMOL/L (ref 11–80)

## 2025-06-30 PROCEDURE — 36415 COLL VENOUS BLD VENIPUNCTURE: CPT

## 2025-06-30 PROCEDURE — 84403 ASSAY OF TOTAL TESTOSTERONE: CPT

## 2025-06-30 PROCEDURE — 87389 HIV-1 AG W/HIV-1&-2 AB AG IA: CPT

## 2025-06-30 PROCEDURE — 84270 ASSAY OF SEX HORMONE GLOBUL: CPT

## 2025-06-30 PROCEDURE — 86803 HEPATITIS C AB TEST: CPT

## 2025-07-02 LAB
TESTOST FREE SERPL-MCNC: 16.71 NG/DL
TESTOST SERPL-MCNC: 656 NG/DL (ref 240–950)

## 2025-07-19 ENCOUNTER — HEALTH MAINTENANCE LETTER (OUTPATIENT)
Age: 50
End: 2025-07-19